# Patient Record
Sex: FEMALE | Race: WHITE | Employment: OTHER | URBAN - METROPOLITAN AREA
[De-identification: names, ages, dates, MRNs, and addresses within clinical notes are randomized per-mention and may not be internally consistent; named-entity substitution may affect disease eponyms.]

---

## 2018-09-19 PROCEDURE — 88175 CYTOPATH C/V AUTO FLUID REDO: CPT | Performed by: FAMILY MEDICINE

## 2018-09-19 PROCEDURE — 87624 HPV HI-RISK TYP POOLED RSLT: CPT | Performed by: FAMILY MEDICINE

## 2019-12-05 PROBLEM — G43.909 MIGRAINE WITHOUT STATUS MIGRAINOSUS, NOT INTRACTABLE, UNSPECIFIED MIGRAINE TYPE: Status: ACTIVE | Noted: 2019-12-05

## 2019-12-05 PROBLEM — E78.2 MIXED HYPERLIPIDEMIA: Status: ACTIVE | Noted: 2019-12-05

## 2019-12-05 PROBLEM — F51.01 PRIMARY INSOMNIA: Status: ACTIVE | Noted: 2019-12-05

## 2020-11-03 ENCOUNTER — APPOINTMENT (OUTPATIENT)
Dept: LAB | Facility: HOSPITAL | Age: 70
End: 2020-11-03
Attending: INTERNAL MEDICINE
Payer: MEDICARE

## 2020-11-03 DIAGNOSIS — Z01.818 PRE-OP TESTING: ICD-10-CM

## 2020-11-06 PROBLEM — Z86.010 HISTORY OF ADENOMATOUS POLYP OF COLON: Status: ACTIVE | Noted: 2020-11-06

## 2020-11-06 PROBLEM — K64.8 INTERNAL HEMORRHOIDS: Status: ACTIVE | Noted: 2020-11-06

## 2020-11-06 PROBLEM — K63.5 COLON POLYP: Status: ACTIVE | Noted: 2020-11-06

## 2020-11-06 PROBLEM — Z86.0101 HISTORY OF ADENOMATOUS POLYP OF COLON: Status: ACTIVE | Noted: 2020-11-06

## 2020-11-06 PROBLEM — K57.90 DIVERTICULOSIS: Status: ACTIVE | Noted: 2020-11-06

## 2021-02-09 ENCOUNTER — IMMUNIZATION (OUTPATIENT)
Dept: LAB | Age: 71
End: 2021-02-09
Attending: HOSPITALIST
Payer: MEDICARE

## 2021-02-09 DIAGNOSIS — Z23 NEED FOR VACCINATION: Primary | ICD-10-CM

## 2021-02-09 PROCEDURE — 0001A SARSCOV2 VAC 30MCG/0.3ML IM: CPT

## 2021-03-02 ENCOUNTER — IMMUNIZATION (OUTPATIENT)
Dept: LAB | Age: 71
End: 2021-03-02
Attending: HOSPITALIST
Payer: MEDICARE

## 2021-03-02 DIAGNOSIS — Z23 NEED FOR VACCINATION: Primary | ICD-10-CM

## 2021-03-02 PROCEDURE — 0002A SARSCOV2 VAC 30MCG/0.3ML IM: CPT

## 2021-03-10 ENCOUNTER — OFFICE VISIT (OUTPATIENT)
Dept: OBGYN CLINIC | Facility: CLINIC | Age: 71
End: 2021-03-10
Payer: MEDICARE

## 2021-03-10 VITALS
WEIGHT: 167 LBS | BODY MASS INDEX: 25.31 KG/M2 | TEMPERATURE: 97 F | DIASTOLIC BLOOD PRESSURE: 72 MMHG | HEIGHT: 68 IN | RESPIRATION RATE: 16 BRPM | SYSTOLIC BLOOD PRESSURE: 124 MMHG | HEART RATE: 95 BPM

## 2021-03-10 DIAGNOSIS — N81.4 UTERINE PROLAPSE: Primary | ICD-10-CM

## 2021-03-10 PROCEDURE — 99213 OFFICE O/P EST LOW 20 MIN: CPT | Performed by: OBSTETRICS & GYNECOLOGY

## 2021-03-10 NOTE — PROGRESS NOTES
Gorge Webber is a 80year old female  No LMP recorded. Patient is postmenopausal. Patient presents with: Other: discuss pessary. Sweetie Elder      He was here 2 years ago and was told if her uterine prolapse got uncomfortable to come back for possible pessar Narrative      Not on file    Social Determinants of Health  Financial Resource Strain:       Difficulty of Paying Living Expenses:   Food Insecurity:       Worried About Running Out of Food in the Last Year:       Ran Out of Food in the Last Year:   Trans (EUTHYROX) 50 MCG Oral Tab, Take 1 tablet (50 mcg total) by mouth once daily. , Disp: 90 tablet, Rfl: 3  •  PARoxetine HCl 20 MG Oral Tab, TAKE 1 TABLET BY MOUTH ONCE DAILY IN THE MORNING, Disp: 90 tablet, Rfl: 3  •  SUMAtriptan Succinate 25 MG Oral Tab, Ta no diagnoses linked to this encounter. Back into third-degree uterine prolapse. Fitted with a pessary #2 ring. Surgery was offered. She will consider and get back to us.

## 2021-07-20 ENCOUNTER — TELEPHONE (OUTPATIENT)
Dept: OBGYN CLINIC | Facility: CLINIC | Age: 71
End: 2021-07-20

## 2021-07-20 NOTE — TELEPHONE ENCOUNTER
Pt called to speak to a nurse about getting a Pessary. Pt states she had the fitting appointment and was told to call when she is ready to order. Please advise.

## 2021-08-16 ENCOUNTER — OFFICE VISIT (OUTPATIENT)
Dept: OBGYN CLINIC | Facility: CLINIC | Age: 71
End: 2021-08-16
Payer: MEDICARE

## 2021-08-16 ENCOUNTER — TELEPHONE (OUTPATIENT)
Dept: OBGYN CLINIC | Facility: CLINIC | Age: 71
End: 2021-08-16

## 2021-08-16 VITALS
DIASTOLIC BLOOD PRESSURE: 70 MMHG | SYSTOLIC BLOOD PRESSURE: 127 MMHG | HEART RATE: 78 BPM | BODY MASS INDEX: 25 KG/M2 | HEIGHT: 68 IN

## 2021-08-16 DIAGNOSIS — N81.4 UTERINE PROLAPSE: ICD-10-CM

## 2021-08-16 DIAGNOSIS — Z12.31 ENCOUNTER FOR MAMMOGRAM TO ESTABLISH BASELINE MAMMOGRAM: Primary | ICD-10-CM

## 2021-08-16 PROCEDURE — A4562 PESSARY, NON RUBBER,ANY TYPE: HCPCS | Performed by: OBSTETRICS & GYNECOLOGY

## 2021-08-16 PROCEDURE — 99212 OFFICE O/P EST SF 10 MIN: CPT | Performed by: OBSTETRICS & GYNECOLOGY

## 2021-08-16 NOTE — TELEPHONE ENCOUNTER
Patient here today for a pessary insertion. She called to make us aware it has fallen out. Instructed her I will route message to Dr Sandra Cockayne for further recommendation. Understanding verbalized.

## 2021-08-16 NOTE — PROGRESS NOTES
Cervix is at introitus when she is laying down. #2 ring pessary was placed. No complaints. 4 months.

## 2021-08-16 NOTE — TELEPHONE ENCOUNTER
Spoke with patient after clarifying with Dr Malvin Billingsley. Dr Malvin Billingsley would like a #2 1/2 sized pessary ordered. Will route to Sienna Locke to order. Patient aware will will place the order and once it comes in we will call her to schedule her appointment for placement.  Un

## 2021-09-15 ENCOUNTER — OFFICE VISIT (OUTPATIENT)
Dept: OBGYN CLINIC | Facility: CLINIC | Age: 71
End: 2021-09-15
Payer: MEDICARE

## 2021-09-15 VITALS — DIASTOLIC BLOOD PRESSURE: 68 MMHG | SYSTOLIC BLOOD PRESSURE: 118 MMHG | HEIGHT: 68 IN | BODY MASS INDEX: 25 KG/M2

## 2021-09-15 DIAGNOSIS — N81.4 UTERINE PROLAPSE: Primary | ICD-10-CM

## 2021-09-15 PROCEDURE — 99212 OFFICE O/P EST SF 10 MIN: CPT | Performed by: OBSTETRICS & GYNECOLOGY

## 2021-09-15 NOTE — PROGRESS NOTES
She has procidentia. Cervix is coming through the introitus when laying down. The other pessary came out after couple hours. She was placed with a 2.5 ring with support.   Cube was discussed if this has  problems

## 2021-09-16 ENCOUNTER — TELEPHONE (OUTPATIENT)
Dept: OBGYN CLINIC | Facility: CLINIC | Age: 71
End: 2021-09-16

## 2021-09-16 NOTE — TELEPHONE ENCOUNTER
Pt states the pessary came out overnight and she had urinated without knowing. Please call to advise the next steps.

## 2021-09-16 NOTE — TELEPHONE ENCOUNTER
Spoke with patient, she was having a bowel movement this morning and the pessary came out. She was leaking urine during the night as well and was not aware that this was happening. During the day yesterday the pessary was fine. Routed to provider for advisement. Patient verbalized understanding.

## 2021-09-17 ENCOUNTER — OFFICE VISIT (OUTPATIENT)
Dept: OBGYN CLINIC | Facility: CLINIC | Age: 71
End: 2021-09-17
Payer: MEDICARE

## 2021-09-17 VITALS
HEIGHT: 68 IN | HEART RATE: 86 BPM | SYSTOLIC BLOOD PRESSURE: 118 MMHG | DIASTOLIC BLOOD PRESSURE: 68 MMHG | BODY MASS INDEX: 25 KG/M2

## 2021-09-17 DIAGNOSIS — N81.4 UTERINE PROLAPSE: Primary | ICD-10-CM

## 2021-09-17 PROCEDURE — 99212 OFFICE O/P EST SF 10 MIN: CPT | Performed by: OBSTETRICS & GYNECOLOGY

## 2021-09-17 NOTE — PROGRESS NOTES
The last pessary stayed in almost 24 hours she did have increased loss of urine when it was in. Physiology of this was discussed. Was fitted for a #3 cube minimal bleeding. Will refer to uro-GYN if this does not work.

## 2021-10-06 ENCOUNTER — HOSPITAL ENCOUNTER (OUTPATIENT)
Dept: MAMMOGRAPHY | Age: 71
Discharge: HOME | End: 2021-10-06
Attending: OBSTETRICS & GYNECOLOGY
Payer: MEDICARE

## 2021-10-06 DIAGNOSIS — Z12.31 ENCOUNTER FOR MAMMOGRAM TO ESTABLISH BASELINE MAMMOGRAM: ICD-10-CM

## 2021-10-06 PROCEDURE — 77063 BREAST TOMOSYNTHESIS BI: CPT | Performed by: OBSTETRICS & GYNECOLOGY

## 2021-10-06 PROCEDURE — 77067 SCR MAMMO BI INCL CAD: CPT | Performed by: OBSTETRICS & GYNECOLOGY

## 2022-01-05 ENCOUNTER — OFFICE VISIT (OUTPATIENT)
Dept: OBGYN CLINIC | Facility: CLINIC | Age: 72
End: 2022-01-05
Payer: MEDICARE

## 2022-01-05 VITALS
BODY MASS INDEX: 25 KG/M2 | HEIGHT: 68 IN | SYSTOLIC BLOOD PRESSURE: 136 MMHG | DIASTOLIC BLOOD PRESSURE: 72 MMHG | HEART RATE: 94 BPM

## 2022-01-05 DIAGNOSIS — N95.0 POSTMENOPAUSAL BLEEDING: ICD-10-CM

## 2022-01-05 DIAGNOSIS — N81.4 UTERINE PROLAPSE: ICD-10-CM

## 2022-01-05 DIAGNOSIS — Z01.419 WELL WOMAN EXAM WITH ROUTINE GYNECOLOGICAL EXAM: ICD-10-CM

## 2022-01-05 DIAGNOSIS — Z12.31 ENCOUNTER FOR SCREENING MAMMOGRAM FOR BREAST CANCER: Primary | ICD-10-CM

## 2022-01-05 DIAGNOSIS — Z12.4 PAPANICOLAOU SMEAR FOR CERVICAL CANCER SCREENING: ICD-10-CM

## 2022-01-05 PROCEDURE — 88175 CYTOPATH C/V AUTO FLUID REDO: CPT | Performed by: OBSTETRICS & GYNECOLOGY

## 2022-01-05 PROCEDURE — G0101 CA SCREEN;PELVIC/BREAST EXAM: HCPCS | Performed by: OBSTETRICS & GYNECOLOGY

## 2022-01-05 NOTE — PROGRESS NOTES
Cuca Murphy is a 80year old female  No LMP recorded. Patient is postmenopausal. Patient presents with:  Wellness Visit: spotting off and on X 1 week pt unsure if it's due to pessary  .      This is the first time were changing the cube pessary she use: No      Sexual activity: Not on file    Other Topics      Concerns:        Not on file    Social History Narrative      Not on file    Social Determinants of Health  Financial Resource Strain: Not on file  Food Insecurity: Not on file  Transportation Vitamins-Minerals (MULTIVITAL) Oral Tab, Take 1 tablet by mouth daily. , Disp: , Rfl:   •  VITAMIN D OR, 1 TABLET DAILY, Disp: , Rfl:   •  FISH OIL OR, 1 TABLET TWICE DAILY, Disp: , Rfl:     ALLERGIES:    Ciprofloxacin           UNKNOWN  [facility] urology consult.

## 2022-01-11 LAB — LAST PAP RESULT: NORMAL

## 2022-02-23 ENCOUNTER — TELEPHONE (OUTPATIENT)
Dept: OBGYN CLINIC | Facility: CLINIC | Age: 72
End: 2022-02-23

## 2022-03-01 ENCOUNTER — HOSPITAL ENCOUNTER (OUTPATIENT)
Dept: BONE DENSITY | Age: 72
Discharge: HOME | End: 2022-03-01
Attending: OBSTETRICS & GYNECOLOGY
Payer: MEDICARE

## 2022-03-01 DIAGNOSIS — M85.80 OSTEOPENIA, UNSPECIFIED LOCATION: ICD-10-CM

## 2022-03-01 PROCEDURE — 77080 DXA BONE DENSITY AXIAL: CPT | Performed by: OBSTETRICS & GYNECOLOGY

## 2023-01-05 ENCOUNTER — HOSPITAL ENCOUNTER (OUTPATIENT)
Dept: MAMMOGRAPHY | Facility: HOSPITAL | Age: 73
Discharge: HOME | End: 2023-01-05
Attending: OBSTETRICS & GYNECOLOGY
Payer: MEDICARE

## 2023-01-05 DIAGNOSIS — Z12.31 ENCOUNTER FOR SCREENING MAMMOGRAM FOR BREAST CANCER: ICD-10-CM

## 2023-01-05 PROCEDURE — 77063 BREAST TOMOSYNTHESIS BI: CPT | Performed by: OBSTETRICS & GYNECOLOGY

## 2023-01-05 PROCEDURE — 77067 SCR MAMMO BI INCL CAD: CPT | Performed by: OBSTETRICS & GYNECOLOGY

## 2023-01-13 ENCOUNTER — HOSPITAL ENCOUNTER (OUTPATIENT)
Dept: MAMMOGRAPHY | Facility: HOSPITAL | Age: 73
Discharge: HOME | End: 2023-01-13
Attending: OBSTETRICS & GYNECOLOGY
Payer: MEDICARE

## 2023-01-13 DIAGNOSIS — R92.2 INCONCLUSIVE MAMMOGRAM: ICD-10-CM

## 2023-01-13 PROCEDURE — 77065 DX MAMMO INCL CAD UNI: CPT | Performed by: OBSTETRICS & GYNECOLOGY

## 2023-01-13 PROCEDURE — 76642 ULTRASOUND BREAST LIMITED: CPT | Performed by: OBSTETRICS & GYNECOLOGY

## 2023-01-13 PROCEDURE — 77061 BREAST TOMOSYNTHESIS UNI: CPT | Performed by: OBSTETRICS & GYNECOLOGY

## 2023-05-10 ENCOUNTER — TELEPHONE (OUTPATIENT)
Dept: UROLOGY | Facility: CLINIC | Age: 73
End: 2023-05-10

## 2023-05-17 ENCOUNTER — OFFICE VISIT (OUTPATIENT)
Dept: UROLOGY | Facility: CLINIC | Age: 73
End: 2023-05-17
Attending: OBSTETRICS & GYNECOLOGY
Payer: MEDICARE

## 2023-05-17 VITALS
HEIGHT: 68 IN | RESPIRATION RATE: 18 BRPM | HEART RATE: 76 BPM | DIASTOLIC BLOOD PRESSURE: 86 MMHG | WEIGHT: 167 LBS | SYSTOLIC BLOOD PRESSURE: 128 MMHG | BODY MASS INDEX: 25.31 KG/M2 | TEMPERATURE: 98 F

## 2023-05-17 DIAGNOSIS — N81.3 COMPLETE UTEROVAGINAL PROLAPSE: Primary | ICD-10-CM

## 2023-05-17 DIAGNOSIS — N81.11 CYSTOCELE, MIDLINE: ICD-10-CM

## 2023-05-17 DIAGNOSIS — N39.3 URINARY, INCONTINENCE, STRESS FEMALE: ICD-10-CM

## 2023-05-17 DIAGNOSIS — N81.6 RECTOCELE: ICD-10-CM

## 2023-05-17 PROCEDURE — 99212 OFFICE O/P EST SF 10 MIN: CPT

## 2023-05-17 RX ORDER — HYDROCHLOROTHIAZIDE 25 MG/1
25 TABLET ORAL DAILY
COMMUNITY
Start: 2023-04-22

## 2023-05-17 RX ORDER — LISINOPRIL 20 MG/1
20 TABLET ORAL DAILY
COMMUNITY
Start: 2023-04-22

## 2023-06-02 ENCOUNTER — TELEPHONE (OUTPATIENT)
Dept: UROLOGY | Facility: CLINIC | Age: 73
End: 2023-06-02

## 2023-06-06 ENCOUNTER — OFFICE VISIT (OUTPATIENT)
Dept: UROLOGY | Facility: CLINIC | Age: 73
End: 2023-06-06
Attending: OBSTETRICS & GYNECOLOGY
Payer: MEDICARE

## 2023-06-06 VITALS
DIASTOLIC BLOOD PRESSURE: 84 MMHG | BODY MASS INDEX: 25.31 KG/M2 | SYSTOLIC BLOOD PRESSURE: 138 MMHG | HEIGHT: 68 IN | WEIGHT: 167 LBS

## 2023-06-06 DIAGNOSIS — N81.6 RECTOCELE: ICD-10-CM

## 2023-06-06 DIAGNOSIS — N81.11 CYSTOCELE, MIDLINE: ICD-10-CM

## 2023-06-06 DIAGNOSIS — N81.3 COMPLETE UTEROVAGINAL PROLAPSE: Primary | ICD-10-CM

## 2023-06-06 DIAGNOSIS — Z01.812 PRE-PROCEDURE LAB EXAM: ICD-10-CM

## 2023-06-06 LAB
BLOOD URINE: NEGATIVE
CONTROL RUN WITHIN 24 HOURS?: YES
LEUKOCYTE ESTERASE URINE: NEGATIVE
NITRITE URINE: NEGATIVE

## 2023-06-06 PROCEDURE — 51797 INTRAABDOMINAL PRESSURE TEST: CPT

## 2023-06-06 PROCEDURE — 51784 ANAL/URINARY MUSCLE STUDY: CPT

## 2023-06-06 PROCEDURE — 51741 ELECTRO-UROFLOWMETRY FIRST: CPT

## 2023-06-06 PROCEDURE — 51729 CYSTOMETROGRAM W/VP&UP: CPT

## 2023-06-06 PROCEDURE — 81002 URINALYSIS NONAUTO W/O SCOPE: CPT

## 2023-06-06 NOTE — PATIENT INSTRUCTIONS
[facility] UROGYNECOLOGY  [address]  [address]: [phone]  FAX: [phone]       Urodynamic Testing Discharge Instructions: There are NO dietary or activity restrictions. You may resume your normal schedule. You may have mild discomfort for a few hours after your testing today. There may be some mild burning when you urinate or you may see some blood in your urine. These problems should not last more than 24 hours. The following suggestions may minimize any symptoms you experience. Drink 6-8 large glasses of water over the next 8 hours  A compress or sitz bath may be soothing  Tylenol or Ibuprofen may be taken as needed    If you experience any of the following, please call the office or, if after hours, the on-call physician at [phone]. Excessive pain  Bright red bloody discharge  Fever or chills  Continued urgency, frequency or burning with urination    Obtaining Test Results    Your urodynamic test will be interpreted by a specialist and available to the referring physician within 7-14 days. Patients in our clinic are given an appointment to come back to discuss the results and any appropriate treatment recommendations. Please do not hesitate to contact our office with any questions or concerns at [phone]. I acknowledge that I have received verbal and written discharge  instructions and that I understand these instructions clearly.     Patient Signature:    Date:

## 2023-06-09 ENCOUNTER — OFFICE VISIT (OUTPATIENT)
Dept: UROLOGY | Facility: CLINIC | Age: 73
End: 2023-06-09
Attending: OBSTETRICS & GYNECOLOGY
Payer: MEDICARE

## 2023-06-09 DIAGNOSIS — N81.11 CYSTOCELE, MIDLINE: ICD-10-CM

## 2023-06-09 DIAGNOSIS — N81.6 RECTOCELE: ICD-10-CM

## 2023-06-09 DIAGNOSIS — N81.3 COMPLETE UTEROVAGINAL PROLAPSE: Primary | ICD-10-CM

## 2023-06-09 DIAGNOSIS — N39.3 URINARY, INCONTINENCE, STRESS FEMALE: ICD-10-CM

## 2023-06-09 PROCEDURE — 99212 OFFICE O/P EST SF 10 MIN: CPT

## 2023-06-09 NOTE — PROGRESS NOTES
ID: Luigi Coley  : 1950  Date: 23    Patient presents with:  UDS Follow-up  Other      HPI:  The patient is a 68year-old female,  (vaginal delivery), who was last originally seen in the office on 23. Please refer to previous office note for full details. To summarize, she presented for evaluation of vaginal prolapse for the past 3 years. She was fitted for a pessary which she tried for about a year. This caused spotting and pessary was removed on 2022 with no further bleeding. She was referred for consultation but did not come until now. She feels prolapse getting worse. She has to reduce for comfort. Voids every 2-3 hours during the day and once at night. Denies JOLYNN or UUI. She did have incontinence with pessary in place. Denies history of UTIs. Bowels are regular. She is not sexually active due to her prolapse. PMHx: HTN, diverticulosis, hypothyroid, GERD, hyperlipidemia, anxiety,     Initial exam demonstrated + UGA, stage 4 uterovaginal prolapse with the cervix being the leading edge of the prolapse at +8 cm. The patient wishes to proceed with surgery. She returns for follow up after having urodynamic testing. Interval history:  Urodynamic testing undergone without complication on . Results reviewed with patient. 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 VLLP 42 cm of water in the absence of DO. Pressure/flow study:  Voided 450 mL with PVR of 25 mL.        Urogynecology Summary:  Urogynecology Summary  Prolapse: Yes  JOLYNN: No  Urge Incontinence: No  Nocturia Frequency: 1  Frequency: 2 - 3 hours  Incomplete emptying: No  Constipation: No  Wears pad day?: 0  Wears Pad Night?: 1  Activities are limited by UI/POP?: No  Currently Sexually Active: No (avoids due to bulge)      Review of Systems:    A comprehensive 12 point review of systems was completed. Pertinent positives noted in the the HPI. Denies CP  Denies SOB    Vitals: There were no vitals taken for this visit. General: Alert and oriented x 3, no acute distress. Pelvic: deferred. Impression:  (N81.3) Complete uterovaginal prolapse  (primary encounter diagnosis)    (N81.11) Cystocele, midline    (N81.6) Rectocele    (N39.3) Urinary, incontinence, stress female      Plan:  Discussed with patient and her , management options for her symptoms. She wishes to proceed with definitive surgical repair. She can be approached vaginally with TVH, possible BS, USLS, APE repairs, sling, cysto. Thorough discussion of surgical risks, benefits, and alternatives including, but not limited to bleeding/clots, infection, injury to nearby organs (urethra, bladder, ureters, bowel, blood vessels), mesh erosion/exposure, dyspareunia, worsening UUI, persistent JOLYNN, prolapse recurrence, voiding dysfunction, and pain. Possible need for additional surgeries to address any complications reviewed. Discussed pain mgmt and potential need for narcotics. Discussed addiction potential with narcotics. IL  reviewed. We reviewed hospital stay and postoperative convalescence. She understands she will need to go home with a catheter. All questions answered. Pre-operative instructions reviewed. IULTD consent signed. She would like to schedule surgery ASAP.         Dr. Perlita Hardin MD, [facility]  Female Pelvic Medicine and  Reconstructive Surgery (Urogynecology)  [phone] (Pager)

## 2023-06-12 DIAGNOSIS — N39.3 URINARY, INCONTINENCE, STRESS FEMALE: ICD-10-CM

## 2023-06-12 DIAGNOSIS — N81.3 UTEROVAGINAL PROLAPSE, COMPLETE: Primary | ICD-10-CM

## 2023-06-12 DIAGNOSIS — N81.11 CYSTOCELE, MIDLINE: ICD-10-CM

## 2023-07-17 ENCOUNTER — LABORATORY ENCOUNTER (OUTPATIENT)
Dept: LAB | Facility: HOSPITAL | Age: 73
End: 2023-07-17
Attending: OBSTETRICS & GYNECOLOGY
Payer: MEDICARE

## 2023-07-17 DIAGNOSIS — Z01.818 PRE-OP TESTING: ICD-10-CM

## 2023-07-17 LAB
ANION GAP SERPL CALC-SCNC: 4 MMOL/L (ref 0–18)
ANTIBODY SCREEN: NEGATIVE
ATRIAL RATE: 70 BPM
BUN BLD-MCNC: 27 MG/DL (ref 7–18)
CALCIUM BLD-MCNC: 9.4 MG/DL (ref 8.5–10.1)
CHLORIDE SERPL-SCNC: 105 MMOL/L (ref 98–112)
CO2 SERPL-SCNC: 28 MMOL/L (ref 21–32)
CREAT BLD-MCNC: 1.14 MG/DL
ERYTHROCYTE [DISTWIDTH] IN BLOOD BY AUTOMATED COUNT: 12.9 %
FASTING STATUS PATIENT QL REPORTED: NO
GFR SERPLBLD BASED ON 1.73 SQ M-ARVRAT: 51 ML/MIN/1.73M2 (ref 60–?)
GLUCOSE BLD-MCNC: 95 MG/DL (ref 70–99)
HCT VFR BLD AUTO: 41.4 %
HGB BLD-MCNC: 13.6 G/DL
MCH RBC QN AUTO: 31.3 PG (ref 26–34)
MCHC RBC AUTO-ENTMCNC: 32.9 G/DL (ref 31–37)
MCV RBC AUTO: 95.4 FL
OSMOLALITY SERPL CALC.SUM OF ELEC: 289 MOSM/KG (ref 275–295)
P AXIS: 48 DEGREES
P-R INTERVAL: 146 MS
PLATELET # BLD AUTO: 276 10(3)UL (ref 150–450)
POTASSIUM SERPL-SCNC: 4.2 MMOL/L (ref 3.5–5.1)
Q-T INTERVAL: 400 MS
QRS DURATION: 76 MS
QTC CALCULATION (BEZET): 432 MS
R AXIS: 1 DEGREES
RBC # BLD AUTO: 4.34 X10(6)UL
RH BLOOD TYPE: POSITIVE
SODIUM SERPL-SCNC: 137 MMOL/L (ref 136–145)
T AXIS: 12 DEGREES
VENTRICULAR RATE: 70 BPM
WBC # BLD AUTO: 5.6 X10(3) UL (ref 4–11)

## 2023-07-17 PROCEDURE — 80048 BASIC METABOLIC PNL TOTAL CA: CPT

## 2023-07-17 PROCEDURE — 86901 BLOOD TYPING SEROLOGIC RH(D): CPT

## 2023-07-17 PROCEDURE — 85027 COMPLETE CBC AUTOMATED: CPT

## 2023-07-17 PROCEDURE — 93005 ELECTROCARDIOGRAM TRACING: CPT

## 2023-07-17 PROCEDURE — 86900 BLOOD TYPING SEROLOGIC ABO: CPT

## 2023-07-17 PROCEDURE — 93010 ELECTROCARDIOGRAM REPORT: CPT | Performed by: INTERNAL MEDICINE

## 2023-07-17 PROCEDURE — 86850 RBC ANTIBODY SCREEN: CPT

## 2023-07-17 PROCEDURE — 36415 COLL VENOUS BLD VENIPUNCTURE: CPT

## 2023-07-17 NOTE — PROGRESS NOTES
Discussed providers message: lab results and recommendation to avoid NSAIDS, patient verbalized understanding.

## 2023-07-27 ENCOUNTER — ANESTHESIA EVENT (OUTPATIENT)
Dept: SURGERY | Facility: HOSPITAL | Age: 73
End: 2023-07-27
Payer: MEDICARE

## 2023-07-27 ENCOUNTER — HOSPITAL ENCOUNTER (OUTPATIENT)
Facility: HOSPITAL | Age: 73
Discharge: HOME | End: 2023-07-28
Attending: OBSTETRICS & GYNECOLOGY | Admitting: OBSTETRICS & GYNECOLOGY
Payer: MEDICARE

## 2023-07-27 ENCOUNTER — ANESTHESIA (OUTPATIENT)
Dept: SURGERY | Facility: HOSPITAL | Age: 73
End: 2023-07-27
Payer: MEDICARE

## 2023-07-27 DIAGNOSIS — Z01.818 PRE-OP TESTING: Primary | ICD-10-CM

## 2023-07-27 DIAGNOSIS — N81.3 UTEROVAGINAL PROLAPSE, COMPLETE: ICD-10-CM

## 2023-07-27 DIAGNOSIS — N81.11 CYSTOCELE, MIDLINE: ICD-10-CM

## 2023-07-27 DIAGNOSIS — N39.3 URINARY, INCONTINENCE, STRESS FEMALE: ICD-10-CM

## 2023-07-27 PROBLEM — Z90.710 STATUS POST VAGINAL HYSTERECTOMY: Status: ACTIVE | Noted: 2023-07-27

## 2023-07-27 PROBLEM — N94.89 ADNEXAL MASS: Status: ACTIVE | Noted: 2023-07-27

## 2023-07-27 LAB — CANCER AG125 SERPL-ACNC: 19.1 U/ML (ref ?–35)

## 2023-07-27 PROCEDURE — 0USG0ZZ REPOSITION VAGINA, OPEN APPROACH: ICD-10-PCS | Performed by: OBSTETRICS & GYNECOLOGY

## 2023-07-27 PROCEDURE — 0UT97ZZ RESECTION OF UTERUS, VIA NATURAL OR ARTIFICIAL OPENING: ICD-10-PCS | Performed by: OBSTETRICS & GYNECOLOGY

## 2023-07-27 PROCEDURE — 58262 VAG HYST INCLUDING T/O: CPT | Performed by: OBSTETRICS & GYNECOLOGY

## 2023-07-27 PROCEDURE — 0JQC0ZZ REPAIR PELVIC REGION SUBCUTANEOUS TISSUE AND FASCIA, OPEN APPROACH: ICD-10-PCS | Performed by: OBSTETRICS & GYNECOLOGY

## 2023-07-27 PROCEDURE — 0TSD0ZZ REPOSITION URETHRA, OPEN APPROACH: ICD-10-PCS | Performed by: OBSTETRICS & GYNECOLOGY

## 2023-07-27 DEVICE — TRANSVAGINAL MID-URETHRAL SLING
Type: IMPLANTABLE DEVICE | Site: VAGINA | Status: FUNCTIONAL
Brand: ADVANTAGE FIT™  SYSTEM

## 2023-07-27 RX ORDER — CEFAZOLIN SODIUM 1 G/3ML
INJECTION, POWDER, FOR SOLUTION INTRAMUSCULAR; INTRAVENOUS AS NEEDED
Status: DISCONTINUED | OUTPATIENT
Start: 2023-07-27 | End: 2023-07-27 | Stop reason: SURG

## 2023-07-27 RX ORDER — LIDOCAINE HYDROCHLORIDE ANHYDROUS AND DEXTROSE MONOHYDRATE .8; 5 G/100ML; G/100ML
INJECTION, SOLUTION INTRAVENOUS CONTINUOUS PRN
Status: DISCONTINUED | OUTPATIENT
Start: 2023-07-27 | End: 2023-07-27 | Stop reason: SURG

## 2023-07-27 RX ORDER — ONDANSETRON 2 MG/ML
INJECTION INTRAMUSCULAR; INTRAVENOUS AS NEEDED
Status: DISCONTINUED | OUTPATIENT
Start: 2023-07-27 | End: 2023-07-27 | Stop reason: SURG

## 2023-07-27 RX ORDER — HYDROMORPHONE HYDROCHLORIDE 1 MG/ML
0.4 INJECTION, SOLUTION INTRAMUSCULAR; INTRAVENOUS; SUBCUTANEOUS EVERY 2 HOUR PRN
Status: DISCONTINUED | OUTPATIENT
Start: 2023-07-27 | End: 2023-07-28

## 2023-07-27 RX ORDER — ONDANSETRON 2 MG/ML
4 INJECTION INTRAMUSCULAR; INTRAVENOUS EVERY 6 HOURS PRN
Status: DISCONTINUED | OUTPATIENT
Start: 2023-07-27 | End: 2023-07-27 | Stop reason: HOSPADM

## 2023-07-27 RX ORDER — SODIUM CHLORIDE, SODIUM LACTATE, POTASSIUM CHLORIDE, CALCIUM CHLORIDE 600; 310; 30; 20 MG/100ML; MG/100ML; MG/100ML; MG/100ML
INJECTION, SOLUTION INTRAVENOUS CONTINUOUS
Status: DISCONTINUED | OUTPATIENT
Start: 2023-07-27 | End: 2023-07-27 | Stop reason: HOSPADM

## 2023-07-27 RX ORDER — LIDOCAINE HYDROCHLORIDE 10 MG/ML
INJECTION, SOLUTION EPIDURAL; INFILTRATION; INTRACAUDAL; PERINEURAL AS NEEDED
Status: DISCONTINUED | OUTPATIENT
Start: 2023-07-27 | End: 2023-07-27 | Stop reason: SURG

## 2023-07-27 RX ORDER — NEOSTIGMINE METHYLSULFATE 1 MG/ML
INJECTION, SOLUTION INTRAVENOUS AS NEEDED
Status: DISCONTINUED | OUTPATIENT
Start: 2023-07-27 | End: 2023-07-27 | Stop reason: SURG

## 2023-07-27 RX ORDER — SUMATRIPTAN 25 MG/1
25 TABLET, FILM COATED ORAL EVERY 2 HOUR PRN
Status: DISCONTINUED | OUTPATIENT
Start: 2023-07-27 | End: 2023-07-28

## 2023-07-27 RX ORDER — DEXAMETHASONE SODIUM PHOSPHATE 4 MG/ML
VIAL (ML) INJECTION AS NEEDED
Status: DISCONTINUED | OUTPATIENT
Start: 2023-07-27 | End: 2023-07-27 | Stop reason: SURG

## 2023-07-27 RX ORDER — HYDROCODONE BITARTRATE AND ACETAMINOPHEN 5; 325 MG/1; MG/1
2 TABLET ORAL ONCE AS NEEDED
Status: DISCONTINUED | OUTPATIENT
Start: 2023-07-27 | End: 2023-07-27 | Stop reason: HOSPADM

## 2023-07-27 RX ORDER — SODIUM CHLORIDE, SODIUM LACTATE, POTASSIUM CHLORIDE, CALCIUM CHLORIDE 600; 310; 30; 20 MG/100ML; MG/100ML; MG/100ML; MG/100ML
INJECTION, SOLUTION INTRAVENOUS CONTINUOUS
Status: DISCONTINUED | OUTPATIENT
Start: 2023-07-27 | End: 2023-07-28

## 2023-07-27 RX ORDER — ROCURONIUM BROMIDE 10 MG/ML
INJECTION, SOLUTION INTRAVENOUS AS NEEDED
Status: DISCONTINUED | OUTPATIENT
Start: 2023-07-27 | End: 2023-07-27 | Stop reason: SURG

## 2023-07-27 RX ORDER — NALOXONE HYDROCHLORIDE 0.4 MG/ML
80 INJECTION, SOLUTION INTRAMUSCULAR; INTRAVENOUS; SUBCUTANEOUS AS NEEDED
Status: DISCONTINUED | OUTPATIENT
Start: 2023-07-27 | End: 2023-07-27 | Stop reason: HOSPADM

## 2023-07-27 RX ORDER — DEXTROSE MONOHYDRATE, SODIUM CHLORIDE, AND POTASSIUM CHLORIDE 50; 1.49; 4.5 G/1000ML; G/1000ML; G/1000ML
INJECTION, SOLUTION INTRAVENOUS CONTINUOUS
Status: DISCONTINUED | OUTPATIENT
Start: 2023-07-27 | End: 2023-07-28

## 2023-07-27 RX ORDER — BUPIVACAINE HYDROCHLORIDE AND EPINEPHRINE 2.5; 5 MG/ML; UG/ML
INJECTION, SOLUTION EPIDURAL; INFILTRATION; INTRACAUDAL; PERINEURAL AS NEEDED
Status: DISCONTINUED | OUTPATIENT
Start: 2023-07-27 | End: 2023-07-27 | Stop reason: HOSPADM

## 2023-07-27 RX ORDER — METOCLOPRAMIDE HYDROCHLORIDE 5 MG/ML
10 INJECTION INTRAMUSCULAR; INTRAVENOUS EVERY 8 HOURS PRN
Status: DISCONTINUED | OUTPATIENT
Start: 2023-07-27 | End: 2023-07-27 | Stop reason: HOSPADM

## 2023-07-27 RX ORDER — HYDROMORPHONE HYDROCHLORIDE 1 MG/ML
0.6 INJECTION, SOLUTION INTRAMUSCULAR; INTRAVENOUS; SUBCUTANEOUS EVERY 5 MIN PRN
Status: DISCONTINUED | OUTPATIENT
Start: 2023-07-27 | End: 2023-07-27 | Stop reason: HOSPADM

## 2023-07-27 RX ORDER — HYDROMORPHONE HYDROCHLORIDE 1 MG/ML
0.2 INJECTION, SOLUTION INTRAMUSCULAR; INTRAVENOUS; SUBCUTANEOUS EVERY 5 MIN PRN
Status: DISCONTINUED | OUTPATIENT
Start: 2023-07-27 | End: 2023-07-27 | Stop reason: HOSPADM

## 2023-07-27 RX ORDER — ENOXAPARIN SODIUM 100 MG/ML
40 INJECTION SUBCUTANEOUS DAILY
Status: DISCONTINUED | OUTPATIENT
Start: 2023-07-28 | End: 2023-07-28

## 2023-07-27 RX ORDER — HYDROCODONE BITARTRATE AND ACETAMINOPHEN 5; 325 MG/1; MG/1
1 TABLET ORAL ONCE AS NEEDED
Status: DISCONTINUED | OUTPATIENT
Start: 2023-07-27 | End: 2023-07-27 | Stop reason: HOSPADM

## 2023-07-27 RX ORDER — PHENYLEPHRINE HCL 10 MG/ML
VIAL (ML) INJECTION AS NEEDED
Status: DISCONTINUED | OUTPATIENT
Start: 2023-07-27 | End: 2023-07-27 | Stop reason: SURG

## 2023-07-27 RX ORDER — LEVOTHYROXINE SODIUM 0.05 MG/1
50 TABLET ORAL DAILY
Status: DISCONTINUED | OUTPATIENT
Start: 2023-07-27 | End: 2023-07-28

## 2023-07-27 RX ORDER — HYDROMORPHONE HYDROCHLORIDE 1 MG/ML
0.2 INJECTION, SOLUTION INTRAMUSCULAR; INTRAVENOUS; SUBCUTANEOUS EVERY 2 HOUR PRN
Status: DISCONTINUED | OUTPATIENT
Start: 2023-07-27 | End: 2023-07-28

## 2023-07-27 RX ORDER — ACETAMINOPHEN 500 MG
1000 TABLET ORAL ONCE
Status: DISCONTINUED | OUTPATIENT
Start: 2023-07-27 | End: 2023-07-27 | Stop reason: HOSPADM

## 2023-07-27 RX ORDER — ACETAMINOPHEN 500 MG
1000 TABLET ORAL EVERY 8 HOURS SCHEDULED
Status: DISCONTINUED | OUTPATIENT
Start: 2023-07-27 | End: 2023-07-28

## 2023-07-27 RX ORDER — CLINDAMYCIN PHOSPHATE 900 MG/50ML
900 INJECTION INTRAVENOUS ONCE
Status: DISCONTINUED | OUTPATIENT
Start: 2023-07-27 | End: 2023-07-27 | Stop reason: HOSPADM

## 2023-07-27 RX ORDER — VASOPRESSIN 20 U/ML
INJECTION PARENTERAL AS NEEDED
Status: DISCONTINUED | OUTPATIENT
Start: 2023-07-27 | End: 2023-07-27 | Stop reason: HOSPADM

## 2023-07-27 RX ORDER — HYDROMORPHONE HYDROCHLORIDE 1 MG/ML
0.4 INJECTION, SOLUTION INTRAMUSCULAR; INTRAVENOUS; SUBCUTANEOUS EVERY 5 MIN PRN
Status: DISCONTINUED | OUTPATIENT
Start: 2023-07-27 | End: 2023-07-27 | Stop reason: HOSPADM

## 2023-07-27 RX ORDER — ONDANSETRON 4 MG/1
4 TABLET, FILM COATED ORAL EVERY 8 HOURS PRN
Status: DISCONTINUED | OUTPATIENT
Start: 2023-07-27 | End: 2023-07-28

## 2023-07-27 RX ORDER — ONDANSETRON 2 MG/ML
4 INJECTION INTRAMUSCULAR; INTRAVENOUS EVERY 8 HOURS PRN
Status: DISCONTINUED | OUTPATIENT
Start: 2023-07-27 | End: 2023-07-28

## 2023-07-27 RX ORDER — HYDROCHLOROTHIAZIDE 25 MG/1
25 TABLET ORAL DAILY
Status: DISCONTINUED | OUTPATIENT
Start: 2023-07-27 | End: 2023-07-28

## 2023-07-27 RX ORDER — LISINOPRIL 20 MG/1
20 TABLET ORAL DAILY
Status: DISCONTINUED | OUTPATIENT
Start: 2023-07-27 | End: 2023-07-28

## 2023-07-27 RX ORDER — LABETALOL HYDROCHLORIDE 5 MG/ML
5 INJECTION, SOLUTION INTRAVENOUS EVERY 5 MIN PRN
Status: DISCONTINUED | OUTPATIENT
Start: 2023-07-27 | End: 2023-07-27 | Stop reason: HOSPADM

## 2023-07-27 RX ORDER — PAROXETINE HYDROCHLORIDE 20 MG/1
20 TABLET, FILM COATED ORAL DAILY
Status: DISCONTINUED | OUTPATIENT
Start: 2023-07-27 | End: 2023-07-28

## 2023-07-27 RX ORDER — GLYCOPYRROLATE 0.2 MG/ML
INJECTION, SOLUTION INTRAMUSCULAR; INTRAVENOUS AS NEEDED
Status: DISCONTINUED | OUTPATIENT
Start: 2023-07-27 | End: 2023-07-27 | Stop reason: SURG

## 2023-07-27 RX ORDER — QUINAPRIL HCL AND HYDROCHLOROTHIAZIDE 20; 25 MG/1; MG/1
1 TABLET ORAL
Status: DISCONTINUED | OUTPATIENT
Start: 2023-07-27 | End: 2023-07-27

## 2023-07-27 RX ORDER — ACETAMINOPHEN 500 MG
1000 TABLET ORAL ONCE AS NEEDED
Status: DISCONTINUED | OUTPATIENT
Start: 2023-07-27 | End: 2023-07-27 | Stop reason: HOSPADM

## 2023-07-27 RX ADMIN — CEFAZOLIN SODIUM 2 G: 1 INJECTION, POWDER, FOR SOLUTION INTRAMUSCULAR; INTRAVENOUS at 14:05:00

## 2023-07-27 RX ADMIN — SODIUM CHLORIDE, SODIUM LACTATE, POTASSIUM CHLORIDE, CALCIUM CHLORIDE: 600; 310; 30; 20 INJECTION, SOLUTION INTRAVENOUS at 13:55:00

## 2023-07-27 RX ADMIN — NEOSTIGMINE METHYLSULFATE 3 MG: 1 INJECTION, SOLUTION INTRAVENOUS at 16:40:00

## 2023-07-27 RX ADMIN — ONDANSETRON 4 MG: 2 INJECTION INTRAMUSCULAR; INTRAVENOUS at 14:45:00

## 2023-07-27 RX ADMIN — PHENYLEPHRINE HCL 100 MCG: 10 MG/ML VIAL (ML) INJECTION at 14:04:00

## 2023-07-27 RX ADMIN — DEXAMETHASONE SODIUM PHOSPHATE 4 MG: 4 MG/ML VIAL (ML) INJECTION at 14:02:00

## 2023-07-27 RX ADMIN — LIDOCAINE HYDROCHLORIDE ANHYDROUS AND DEXTROSE MONOHYDRATE 2 MG/KG/HR: .8; 5 INJECTION, SOLUTION INTRAVENOUS at 14:10:00

## 2023-07-27 RX ADMIN — SODIUM CHLORIDE, SODIUM LACTATE, POTASSIUM CHLORIDE, CALCIUM CHLORIDE: 600; 310; 30; 20 INJECTION, SOLUTION INTRAVENOUS at 16:37:00

## 2023-07-27 RX ADMIN — ROCURONIUM BROMIDE 50 MG: 10 INJECTION, SOLUTION INTRAVENOUS at 13:59:00

## 2023-07-27 RX ADMIN — GLYCOPYRROLATE 0.4 MG: 0.2 INJECTION, SOLUTION INTRAMUSCULAR; INTRAVENOUS at 16:40:00

## 2023-07-27 RX ADMIN — LIDOCAINE HYDROCHLORIDE 5 ML: 10 INJECTION, SOLUTION EPIDURAL; INFILTRATION; INTRACAUDAL; PERINEURAL at 13:59:00

## 2023-07-27 NOTE — INTERVAL H&P NOTE
Pre-op Diagnosis: Uterovaginal prolapse, complete [N81.3]  Cystocele, midline [N81.11]  Urinary, incontinence, stress female [N39.3]    The above referenced H&P was reviewed by Pieter Louis MD on 7/27/2023, the patient was examined and no significant changes have occurred in the patient's condition since the H&P was performed. I discussed with the patient and/or legal representative the potential benefits, risks and side effects of this procedure; the likelihood of the patient achieving goals; and potential problems that might occur during recuperation. I discussed reasonable alternatives to the procedure, including risks, benefits and side effects related to the alternatives and risks related to not receiving this procedure. We will proceed with procedure as planned.

## 2023-07-27 NOTE — PROGRESS NOTES
[facility]  Brief Op Note    Gita Andrews Location: OR   [facility] [id number] MRN [id number]   Admission Date 7/27/2023 Operation Date 7/27/2023   Attending Physician Vickie Lin MD Operating Physician Renae Rolon MD     Pre-Operative Diagnosis: Uterovaginal prolapse, complete [N81.3]  Cystocele, midline [N81.11]  Urinary, incontinence, stress female [N39.3]    Post-Operative Diagnosis: Same as above    Procedure Performed: Procedure(s):  TOTAL VAGINAL HYSTERECTOMY, LEFT SALPINGECTOMY AND LEFT OOPHORECTOMY ([facility])  [facility] ENTEROCELE REPAIR, PLACEMENT OF MID-URETHRAL SLING CYSTOSCOPY ([address])    Anesthesia: General    EBL: 50 cc    Summary of Case: prolapse uterus  right pelvic mass  Complications:none  Renae Rolon MD  7/27/2023  3:09 PM

## 2023-07-27 NOTE — H&P
[facility]    PATIENT'S NAME: Patricia Martinez   ATTENDING PHYSICIAN: Martín Hernandez M.D. PATIENT ACCOUNT#:   [de-identified]    LOCATION:    MEDICAL RECORD #:   [id number]       YOB: 1950  ADMISSION DATE:       07/27/2023    HISTORY AND PHYSICAL EXAMINATION    HISTORY OF PRESENT ILLNESS:  She is a 78-year-old G1, P1, who was having some prolapse. She had 1 vaginal delivery. She was using a pessary. Was referred to Urogynecology for possible surgical repair. PAST MEDICAL HISTORY:  She had 1 vaginal delivery. MEDICATIONS:  Levothyroxine, omeprazole, high blood pressure medicine, atorvastatin, sumatriptan, Xanax. PHYSICAL EXAMINATION:    GENERAL:  Well-developed, well-nourished female. LUNGS:  Clear. HEART:  Regular rate and rhythm. ABDOMEN:  Soft. PELVIC:  Refer to Urogynecology. ASSESSMENT:  Prolapse. PLAN:  Surgical repair with Urogynecology after vaginal hysterectomy.       Dictated By Martín Hernandez M.D.  d: 07/27/2023 08:57:21  t: 07/27/2023 09:11:49  Job [id number]  Z/

## 2023-07-27 NOTE — BRIEF OP NOTE
Pre-Operative Diagnosis: Uterovaginal prolapse, complete [N81.3]  Cystocele, midline [N81.11]  Urinary, incontinence, stress female [N39.3]     Post-Operative Diagnosis: Uterovaginal prolapse, complete [T73. 3]Cystocele, midline [N81.11]Urinary, incontinence, stress female [N39.3] right adnexal mass      Procedure Performed:   TOTAL VAGINAL HYSTERECTOMY, LEFT SALPINGECTOMY AND LEFT OOPHORECTOMY ([facility])  Uterosacral ligament suspension, anterior, posterior, enterocele repair, sling, cystoscopy ([facility]). Surgeon(s) and Role:  Panel 1:     * Yogi Dhaliwal MD - Primary     * Nathaly Mendez MD - Assisting Surgeon  Panel 2:     Burt Feng MD - Primary    Assistant(s):  Surgical Assistant.: Katrin Delgado     Surgical Findings: Stage 4 uterovaginal prolapse, cystocele, rectocele, enterocele. Large, 15 cm right adnexal mass. Specimen: Not from my portion of the procedure.       Estimated Blood Loss: Blood Output: 150 mL (7/27/2023  4:38 PM)      Shauna Hernandez MD  7/27/2023  6:43 PM

## 2023-07-27 NOTE — ANESTHESIA PROCEDURE NOTES
Airway  Date/Time: 7/27/2023 2:01 PM  Urgency: elective    Airway not difficult    General Information and Staff    Patient location during procedure: OR  Anesthesiologist: Victorina Adam MD  Performed: anesthesiologist   Performed by: Victorina Adam MD  Authorized by: Victorina Adam MD      Indications and Patient Condition  Indications for airway management: anesthesia  Sedation level: deep  Preoxygenated: yes  Patient position: sniffing  Mask difficulty assessment: 1 - vent by mask    Final Airway Details  Final airway type: endotracheal airway      Successful airway: ETT  Cuffed: yes   Successful intubation technique: direct laryngoscopy  Facilitating devices/methods: intubating stylet  Endotracheal tube insertion site: oral  Blade: Kamala  Blade size: #3  ETT size (mm): 7.0    Cormack-Lehane Classification: grade I - full view of glottis  Placement verified by: capnometry   Measured from: lips  ETT to lips (cm): 22  Number of attempts at approach: 1

## 2023-07-27 NOTE — ANESTHESIA POSTPROCEDURE EVALUATION
[facility] Patient Status:  Hospital Outpatient Surgery   Age/Gender 68year old female MRN [id number]   [facility] SURGERY Attending Ameena Collins MD   [address] Day # 0 PCP Tenisha Varner DO       Anesthesia Post-op Note    TOTAL VAGINAL HYSTERECTOMY, LEFT SALPINGECTOMY AND LEFT OOPHORECTOMY [place])    Procedure Summary       Date: 07/27/23 Room / Location: [facility] MAIN OR 09 / 1404 [facility] OR    Anesthesia Start: 1175 Anesthesia Stop: 6188    Procedures:       TOTAL VAGINAL HYSTERECTOMY, LEFT SALPINGECTOMY AND LEFT OOPHORECTOMY ([facility]) (Bilateral: Vagina )      URETEROSACRAL LIGAMENT SUSPENSION ANTERIOR POSTERIOR ENTEROCELE REPAIR, PLACEMENT OF MID-URETHRAL SLING CYSTOSCOPY ([facility]) (Vagina ) Diagnosis:       Uterovaginal prolapse, complete      Cystocele, midline      Urinary, incontinence, stress female      Rectocele      Omental mass      (Uterovaginal prolapse, complete [H12. 3]Cystocele, midline [N81.11]Urinary, incontinence, stress female [N39.3] right omental mass)    Surgeons: Ameena Collins MD; Quin Barger MD Anesthesiologist: Richmond Reed MD    Anesthesia Type: general ASA Status: 2            Anesthesia Type: general    Vitals Value Taken Time   /62 07/27/23 1651   Temp 99.1 07/27/23 1651   Pulse 78 07/27/23 1651   Resp 12 07/27/23 1651   SpO2 92 % 07/27/23 1651   Vitals shown include unvalidated device data. Patient Location: PACU    Anesthesia Type: general    Airway Patency: patent and extubated    Postop Pain Control: adequate    Mental Status: preanesthetic baseline    Nausea/Vomiting: none    Cardiopulmonary/Hydration status: stable euvolemic    Complications: no apparent anesthesia related complications    Postop vital signs: stable    Dental Exam: Unchanged from Preop    Patient to be discharged from PACU when criteria met.

## 2023-07-28 ENCOUNTER — APPOINTMENT (OUTPATIENT)
Dept: CT IMAGING | Facility: HOSPITAL | Age: 73
End: 2023-07-28
Attending: OBSTETRICS & GYNECOLOGY
Payer: MEDICARE

## 2023-07-28 VITALS
TEMPERATURE: 98 F | OXYGEN SATURATION: 97 % | WEIGHT: 155.63 LBS | SYSTOLIC BLOOD PRESSURE: 98 MMHG | BODY MASS INDEX: 23.59 KG/M2 | HEART RATE: 74 BPM | DIASTOLIC BLOOD PRESSURE: 56 MMHG | HEIGHT: 68 IN | RESPIRATION RATE: 18 BRPM

## 2023-07-28 PROBLEM — Z01.818 PRE-OP TESTING: Status: ACTIVE | Noted: 2023-07-28

## 2023-07-28 LAB
ALBUMIN SERPL-MCNC: 3.4 G/DL (ref 3.4–5)
ALBUMIN/GLOB SERPL: 1 {RATIO} (ref 1–2)
ALP LIVER SERPL-CCNC: 64 U/L
ALT SERPL-CCNC: 19 U/L
ANION GAP SERPL CALC-SCNC: 6 MMOL/L (ref 0–18)
AST SERPL-CCNC: 10 U/L (ref 15–37)
BASOPHILS # BLD AUTO: 0.01 X10(3) UL (ref 0–0.2)
BASOPHILS NFR BLD AUTO: 0.1 %
BILIRUB SERPL-MCNC: 0.9 MG/DL (ref 0.1–2)
BUN BLD-MCNC: 26 MG/DL (ref 7–18)
CALCIUM BLD-MCNC: 8.7 MG/DL (ref 8.5–10.1)
CHLORIDE SERPL-SCNC: 104 MMOL/L (ref 98–112)
CO2 SERPL-SCNC: 26 MMOL/L (ref 21–32)
CREAT BLD-MCNC: 1.14 MG/DL
EGFRCR SERPLBLD CKD-EPI 2021: 51 ML/MIN/1.73M2 (ref 60–?)
EOSINOPHIL # BLD AUTO: 0 X10(3) UL (ref 0–0.7)
EOSINOPHIL NFR BLD AUTO: 0 %
ERYTHROCYTE [DISTWIDTH] IN BLOOD BY AUTOMATED COUNT: 12.3 %
GLOBULIN PLAS-MCNC: 3.4 G/DL (ref 2.8–4.4)
GLUCOSE BLD-MCNC: 124 MG/DL (ref 70–99)
HCT VFR BLD AUTO: 36.2 %
HGB BLD-MCNC: 12.1 G/DL
IMM GRANULOCYTES # BLD AUTO: 0.04 X10(3) UL (ref 0–1)
IMM GRANULOCYTES NFR BLD: 0.4 %
LYMPHOCYTES # BLD AUTO: 1.13 X10(3) UL (ref 1–4)
LYMPHOCYTES NFR BLD AUTO: 11.8 %
MCH RBC QN AUTO: 31.4 PG (ref 26–34)
MCHC RBC AUTO-ENTMCNC: 33.4 G/DL (ref 31–37)
MCV RBC AUTO: 94 FL
MONOCYTES # BLD AUTO: 0.76 X10(3) UL (ref 0.1–1)
MONOCYTES NFR BLD AUTO: 7.9 %
NEUTROPHILS # BLD AUTO: 7.64 X10 (3) UL (ref 1.5–7.7)
NEUTROPHILS # BLD AUTO: 7.64 X10(3) UL (ref 1.5–7.7)
NEUTROPHILS NFR BLD AUTO: 79.8 %
OSMOLALITY SERPL CALC.SUM OF ELEC: 288 MOSM/KG (ref 275–295)
PLATELET # BLD AUTO: 245 10(3)UL (ref 150–450)
POTASSIUM SERPL-SCNC: 4.3 MMOL/L (ref 3.5–5.1)
PROT SERPL-MCNC: 6.8 G/DL (ref 6.4–8.2)
RBC # BLD AUTO: 3.85 X10(6)UL
SODIUM SERPL-SCNC: 136 MMOL/L (ref 136–145)
WBC # BLD AUTO: 9.6 X10(3) UL (ref 4–11)

## 2023-07-28 PROCEDURE — 74177 CT ABD & PELVIS W/CONTRAST: CPT | Performed by: OBSTETRICS & GYNECOLOGY

## 2023-07-28 RX ORDER — PHENAZOPYRIDINE HYDROCHLORIDE 100 MG/1
100 TABLET, FILM COATED ORAL 2 TIMES DAILY PRN
Qty: 10 TABLET | Refills: 0 | Status: SHIPPED | OUTPATIENT
Start: 2023-07-28

## 2023-07-28 RX ORDER — PHENAZOPYRIDINE HYDROCHLORIDE 100 MG/1
100 TABLET, FILM COATED ORAL 2 TIMES DAILY PRN
Status: DISCONTINUED | OUTPATIENT
Start: 2023-07-28 | End: 2023-07-28

## 2023-07-28 NOTE — OPERATIVE REPORT
[facility]    PATIENT'S NAME: Alexandria Mix   ATTENDING PHYSICIAN: Mohan Bates M.D. OPERATING PHYSICIAN: Mohan Bates M.D. PATIENT ACCOUNT#:   [de-identified]    LOCATION:  OR  OR [facility]  MEDICAL RECORD #:   [id number]       YOB: 1950  ADMISSION DATE:       07/27/2023      OPERATION DATE:  07/27/2023    OPERATIVE REPORT    PREOPERATIVE DIAGNOSIS:  Prolapse. POSTOPERATIVE DIAGNOSIS:  Prolapse. PROCEDURE:      ASSISTANT SURGEON:  Luis Beltran M.D.     ESTIMATED BLOOD LOSS:  From this part of procedure 50 mL. COMPLICATIONS:  None. OPERATIVE TECHNIQUE:  The patient was prepped and draped in the usual sterile manner. Exam under anesthesia after the bladder was emptied noted a free pelvic mass approximately 6 to 8 cm. Ankush-Synephrine was injected around the cervix, the circumferential incision around the cervix. The cul-de-sac was entered and was free. Bladder was dissected from the cervix. Anterior cul-de-sac was entered and noted to be free. There was a large, apparently ovarian cyst that was smooth-walled on the part we could see. Uterosacral ligaments were clamped, cut, and ligated. Cardinal ligaments were clamped, cut, and ligated. Uterine arteries were clamped, cut, and ligated. The uterus and the cervix were removed. The left tube and ovary were visualized and appeared to be normal and were removed.       Dictated By Mohan Bates M.D.  d: 07/27/2023 15:08:38  t: 07/27/2023 16:45:05  Job [id number]  KLD/

## 2023-07-28 NOTE — PLAN OF CARE
Problem: Patient/Family Goals  Goal: Patient/Family Long Term Goal  Description: Patient's Long Term Goal: go home    Interventions:  - IV fluids  -Pain management  -VS q 4 hrs  -Monitor labs  - See additional Care Plan goals for specific interventions  Outcome: Not Progressing  Goal: Patient/Family Short Term Goal  Description: Patient's Short Term Goal: have tolerable pain    Interventions:   - give pain meds as needed    - See additional Care Plan goals for specific interventions  Outcome: Progressing     Problem: PAIN - ADULT  Goal: Verbalizes/displays adequate comfort level or patient's stated pain goal  Description: INTERVENTIONS:  - Encourage pt to monitor pain and request assistance  - Assess pain using appropriate pain scale  - Administer analgesics based on type and severity of pain and evaluate response  - Implement non-pharmacological measures as appropriate and evaluate response  - Consider cultural and social influences on pain and pain management  - Manage/alleviate anxiety  - Utilize distraction and/or relaxation techniques  - Monitor for opioid side effects  - Notify MD/LIP if interventions unsuccessful or patient reports new pain  - Anticipate increased pain with activity and pre-medicate as appropriate  Outcome: Progressing     Problem: SAFETY ADULT - FALL  Goal: Free from fall injury  Description: INTERVENTIONS:  - Assess pt frequently for physical needs  - Identify cognitive and physical deficits and behaviors that affect risk of falls.   - [place] fall precautions as indicated by assessment.  - Educate pt/family on patient safety including physical limitations  - Instruct pt to call for assistance with activity based on assessment  - Modify environment to reduce risk of injury  - Provide assistive devices as appropriate  - Consider OT/PT consult to assist with strengthening/mobility  - Encourage toileting schedule  Outcome: Progressing

## 2023-07-28 NOTE — PROGRESS NOTES
A&Ox4. VSS. RA.   GI: Abdomen soft, nondistended. Denies nausea. : dolan catheter draining clear yellow urine, patient states she still feels some pressure,but improved from last night. Gila pad CDI  Pain controlled with PRN pain medications  Up with standby assist.  Incisions:lap sites x 2 CDI  Diet:general  IVF running per order. All appropriate safety measures in place. All questions and concerns addressed.  Will continue to monitor

## 2023-07-28 NOTE — PROGRESS NOTES
2330:Pt c/o lower pressure. Noted dolan with slow drainage, tried repositionning pt and standing but not much urine drained. . On call OB paged, ok to manually irrigate and if issue continue to page Dr Tabatha Liao. 1254:Unable to get hold of Dr Tabatha Liao via perfect serve or her cell phone, left voicemail    0145:Urology consulted, Nathaly Garcia on call, no new order, referred back to OB/Gyn and instructed not to reach back tonight    0255: Dolan exchange per OB/gyn order    0415:Dr Ospina at bedside to see pt. Stat CT scan ordered    0430:Pt taken for CT scan    0455:Pt back to floor    0600:Pt in so much discomfort, feels need to urinate but dolan not draining, abdomen super distended.  Pain meds given and trying to get hold of Dr Tabatha Liao

## 2023-07-28 NOTE — DISCHARGE INSTRUCTIONS
Pelvic Medicine Postoperative Instructions:    1. AVOID CONSTIPATION:   -Take Miralax one capful in water or juice each morning. You can also take each evening if needed.  -Take Fiber supplement along with Miralax as well. 2. No lifting over 10 pounds (1 gallon of mild is 8 pounds). 3. Showers and tub baths are okay, even if you have a catheter or abdominal incision. 4. You may ride in a car immediately. 5. You may drive after 1-2 weeks. Please call us for any of the following:  -Temperature above 100.5 for 4 hours or above 101.0 at any time.  -Chest pain or trouble breathing.  -Vaginal bleeding heavier than a period.  -Redness, tenderness or swelling of your legs  -Pain or burning when you urinate; or if you go home with a catheter, trouble with catheter draining.  -Redness, pain or foul discharge from incision.     Office telephone: [phone]  After hours: [phone]

## 2023-07-28 NOTE — PROGRESS NOTES
NURSING ADMISSION NOTE      Patient admitted via BED from PACU  Oriented to room. Safety precautions initiated. Bed in low position. Call light in reach. Alert & oriented x4. With tolerable pain . No nausea or vomiting. With 2 incisions sites with dermabond on pubic area c/d/I. Peripad with scant blood in it. Ellis catheter intact. Assessment done Plan  of care discussed with patient. Will monitor.

## 2023-07-28 NOTE — OPERATIVE REPORT
Chuy Kovacs   : 1950  MRN: [id number]  Date of Surgery: 2023    Pre-operative diagnosis:  1. Stage 4 uterovaginal prolapse, cystocele, rectocele. 2. Stress urinary incontinence. Post-operative diagnosis:  1. Stage 4 uterovaginal prolapse, cystocele, rectocele, enterocele. 2. Stress urinary incontinence. 3. Right adnexal mass. Procedures:  Repair of enterocele; bilateral uterosacral ligament suspension; anterior colporrhaphy; posterior colporrhaphy; retropubic midurethral sling; cystoscopy. Surgeon:  Caleb Lucas MD    Assistant: ANUP Ibarrabutch 75, OR 9     Anesthesia:  General endotracheal     Estimated blood loss:  150 ml     Findings: Large 10-15 right adnexal mass, multiloculated, non mobile. Stage 4 uterovaginal prolapse with associated cystocele, rectocele and enterocele. Bilateral ureteral efflux. Hemostasis upon completion of the case. Complications: None    Specimens: None for my portion of the case. Description of the Procedure: The patient remained in dorsal lithotomy position prepped and draped under general anesthesia after Dr. Tawny Zazueta completed the vaginal hysterectomy and left salpingo-oophorectomy and I assisted her with that portion of the procedure. Then I took over as primary surgeon. Decision was made not to pursue excision of right adnexal mass and to evaluate with imaging and referral to [address] [facility]. At this point Dr. Tawny Zazueta scrubbed out of the case and I continued with prolapse repair. Due to the presence of apical prolapse and associated enterocele, it was necessary to proceed with suspension of the vaginal apex and enterocele repair. A lap sponge was placed into the peritoneal cavity and the uterosacral ligaments identified. A Butcher culdoplasty was performed.  Utilizing 1-0 Vicryl suture, a Butcher stitch was placed through the posterior vaginal wall, into the distal left uterosacral ligament, across the posterior cul-de-sac peritoneum, through the distal right uterosacral ligament and back through the posterior vagina in order to repair the enterocele. This suture was held in tension. Following this, two uterosacral suspension stitches were then placed using 1-0 Vicryl sutures on each side of the pelvis. These were placed high on the uterosacral ligaments at and just proximal to the ischial spine. Once these sutures were placed and held in tension, cystoscopy was performed with a 70-degree scope. The patient was given 5 mL of indigo carmine IV dye. Cystoscopy confirmed that there had been no injury to the bladder. There was noted to be prompt flow of blue-stained urine from both ureteral orifices, confirming ureteral patency. There were no bladder lesions. Following this, Allis clamps were used to grasp the redundant anterior vaginal epithelium in the midline and a midline incision was made after infiltration of Marcaine with epinephrine. The redundant epithelium was dissected from the underlying endopelvic connective tissue of the bladder and 0 Vicryl sutures were then used to plicate the endopelvic connective tissue, obliterating the  cystocele. The excess vaginal epithelium was excised and the midline incision was then closed with 2-0 Vicryl suture in running locking fashion. The lap sponge was removed from the peritoneal cavity and the vaginal apex was then closed with 2-0 Vicryl suture in a running locking fashion after hemostasis was verified once again. The uterosacral and Butcher sutures were then tied down resulting in excellent elevation of the vaginal apex and correction of the enterocele. Allis clamps were then used to grasp the vaginal epithelium suburethrally and a scalpel was used to make a separate 2 cm midline incision after infiltration of Marcaine with epinephrine. The vaginal epithelium was dissected off the underlying periurethral tissue.  The Advantage fit trocar was passed from the vaginal side to the retropubic side bilaterally. Exit markings had been made on the skin previously. Cystoscopy was repeated. Ureteral patency was confirmed once again. There was no evidence of intravesical lesion, foreign body or bladder perforation. The sling was then placed in the midurethra in a tension free fashion. There was no evidence of tunneling of the vaginal mucosa. The excess sling was trimmed, and the vaginal mucosa was closed in the midline with running 2-0 Vicryl suture. Skin incisions were reapproximated with skin glue. Attention was then directed to the posterior compartment. The redundant posterior vaginal epithelium was excised. Then utilizing interrupted No. 0 Vicryl; the endopelvic fibromuscular tissue was plicated in the midline in the standard fashion. The vaginal epithelium was reaproximated with 2-0 Vicryl in a running fashion. She had a well-supported perineal body. Examination confirmed that all pelvic floor defects were addressed. A Ellis catheter was placed transurethrally. Rectal examination was intact. The patient was then removed from the dorsal lithotomy position, awakened and extubated and transferred from the operating room to the recovery room in stable condition, having tolerated the procedure well. Sponge, lap, & needle counts were correct.      Doreen Hay MD

## 2023-07-28 NOTE — DISCHARGE SUMMARY
[address]    PATIENT'S NAME: Andrea Bullock   ATTENDING PHYSICIAN: Laura Desai M.D. PATIENT ACCOUNT#:   [de-identified]    LOCATION:  [address] [place] [address]  MEDICAL RECORD #:   [id number]       YOB: 1950  ADMISSION DATE:       07/27/2023      DISCHARGE DATE:  07/28/2023    DISCHARGE SUMMARY      HISTORY AND HOSPITAL COURSE:  She was admitted yesterday for a vaginal hysterectomy and left BSO. A large pelvic mass was noted. She had a CAT scan which showed the mass probably to be an ovarian. Her  was 19. Creatinine was 1.14. Hemoglobin was 12.1. Pathology is pending. She also had repair of an enterocele, bilateral uterosacral ligament suspension, anterior colporrhaphy and posterior repair, retropubic urethral mid sling. These were done by Gynecologic Urology. She did well except last night the Ellis was not draining, so she got a fluid bolus and got the Ellis replaced. She is now comfortable, is not taking any opiates for pain. Minimal vaginal bleeding. DISCHARGE INSTRUCTIONS:  She will follow up with gynecologic oncologist and Dr. Ashwini Raymundo in 1 week. She can follow up with me in 4 to 6 weeks whenever it is convenient. Instructions given.       Dictated By Laura Desai M.D.  d: 07/28/2023 13:20:15  t: 07/28/2023 16:11:38  [facility] [id number]  Pending sale to [facility]/

## 2023-07-28 NOTE — PROGRESS NOTES
NURSING DISCHARGE NOTE    Discharged Home via Wheelchair. Accompanied by RN. Belongings Taken by patient/family.

## 2023-07-28 NOTE — PROGRESS NOTES
Speaking with Dr Yamilex Chadwick, order to discontinue the urology consult, give the pt a bolus and she will see the pt this morning.

## 2023-07-31 ENCOUNTER — TELEPHONE (OUTPATIENT)
Facility: CLINIC | Age: 73
End: 2023-07-31

## 2023-07-31 NOTE — TELEPHONE ENCOUNTER
Spoke with patient. Dr Jennifer Tolliver requested this patient to receive Dr Brady Ashton contact information. Information provided in her Mychart. Patient aware and verbalized understanding.

## 2023-08-03 ENCOUNTER — OFFICE VISIT (OUTPATIENT)
Dept: UROLOGY | Facility: CLINIC | Age: 73
End: 2023-08-03
Attending: OBSTETRICS & GYNECOLOGY
Payer: MEDICARE

## 2023-08-03 VITALS — HEIGHT: 65 IN | WEIGHT: 155 LBS | BODY MASS INDEX: 25.83 KG/M2 | TEMPERATURE: 98 F

## 2023-08-03 DIAGNOSIS — R33.8 POSTOPERATIVE RETENTION OF URINE: Primary | ICD-10-CM

## 2023-08-03 DIAGNOSIS — N99.89 POSTOPERATIVE RETENTION OF URINE: Primary | ICD-10-CM

## 2023-08-03 PROCEDURE — 99212 OFFICE O/P EST SF 10 MIN: CPT

## 2023-08-03 NOTE — PATIENT INSTRUCTIONS
Voiding Trial Instructions  You have passed your voiding trial at 8:30am.  Please make sure you are drinking some water today. You can take your Motrin to help with any swelling from the catheter. It is important to try and empty your bladder every two hours during the day. Try and empty again at 10:30am.  If you are unable to empty, try and drink a glass of water and try again 30-60 minutes later. If you have been unable to empty your bladder by 12:30pm, which is 4 hours after leaving the office, you will most likely be uncomfortable and you will need to come back into the office. If it is after 4pm, go to an urgent care or emergency room to have a catheter placed again. Please call our office at [phone] if you have any questions or concerns.

## 2023-08-03 NOTE — PROGRESS NOTES
Spoke with patient, states she was able to void at 10:30am and again about 12:30 pm. Patient states that she voided freely and has no problems. Reviewed s/s of urinary retention and UTI symptoms and invited the patient to call office with any questions. Patient verbalized understanding and all questions were answered.

## 2023-08-03 NOTE — PROCEDURES
S:   Patient here for voiding trial following surgery  Procedure Date:   Procedure Name: Anterior and Posterior Repair, Cystoscopy, Mid-urethral Sling, Uterosacral Ligament Suspension, Vaginal Hysterectomy, and Other Left salpingoophorectomy   Doing well, no complaints  BMs regular using Benefiber and Miralax   Pain well controlled using Ibuprofen and Other phenazopyridine    Denies s/sx of UTI   Tolerates ambulation & diet well     O:   There were no vitals filed for this visit. Gen: NAD  Pulm: nl effort  : No active bleeding. Discharge wnl  Surgical sites-WNL  Dolan intact and draining clear yellow urine. Disconnected dolan catheter from drainage tubing. Using a 60 cc Ko syringe, 300ml sterile water was instilled per gravity. Balloon deflated using 10 cc syringe, and catheter removed. Pt up to bathroom and voided 200mL plus leakage on the way to the bathroom. A 16 Slovenian dolan catheter was placed and patient had a PVR of 125. Findings discussed and evaluated with MARTIN Pierce RN. Patient passes voiding trial.    Patient  tolerated procedure well. A:   Postoperative retention of urine  (primary encounter diagnosis)    P:  Patient passed voiding trial-instructed to call in early afternoon for voiding update. Office number provided. Reviewed post op restrictions and bowel management  Reviewed signs and sx of urinary retention and UTI. Discussed return to work/activity plans  Follow-up with Dr. Romelia Curiel in 5 weeks.    All questions answered  She understands and agrees to plan

## 2023-08-16 ENCOUNTER — TELEPHONE (OUTPATIENT)
Dept: UROLOGY | Facility: CLINIC | Age: 73
End: 2023-08-16

## 2023-08-16 NOTE — TELEPHONE ENCOUNTER
TC to pt per Dr Alice Hodge request to see if she made appt w/ Dr Sulma Vasquez. Pt reports she did and she already has surgery scheduled for next Wed.

## 2023-09-13 ENCOUNTER — OFFICE VISIT (OUTPATIENT)
Dept: UROLOGY | Facility: CLINIC | Age: 73
End: 2023-09-13
Attending: OBSTETRICS & GYNECOLOGY
Payer: MEDICARE

## 2023-09-13 VITALS — WEIGHT: 155 LBS | HEIGHT: 65 IN | BODY MASS INDEX: 25.83 KG/M2 | RESPIRATION RATE: 20 BRPM

## 2023-09-13 DIAGNOSIS — N39.41 URGE INCONTINENCE: ICD-10-CM

## 2023-09-13 DIAGNOSIS — Z98.890 POST-OPERATIVE STATE: Primary | ICD-10-CM

## 2023-09-13 PROCEDURE — 99212 OFFICE O/P EST SF 10 MIN: CPT

## 2023-09-13 RX ORDER — TROSPIUM CHLORIDE 20 MG/1
20 TABLET, FILM COATED ORAL NIGHTLY
Qty: 90 TABLET | Refills: 3 | Status: SHIPPED | OUTPATIENT
Start: 2023-09-13

## 2023-09-15 ENCOUNTER — TELEPHONE (OUTPATIENT)
Facility: CLINIC | Age: 73
End: 2023-09-15

## 2023-10-02 ENCOUNTER — OFFICE VISIT (OUTPATIENT)
Facility: CLINIC | Age: 73
End: 2023-10-02
Payer: MEDICARE

## 2023-10-02 VITALS
DIASTOLIC BLOOD PRESSURE: 84 MMHG | BODY MASS INDEX: 24.99 KG/M2 | HEIGHT: 65 IN | HEART RATE: 88 BPM | WEIGHT: 150 LBS | SYSTOLIC BLOOD PRESSURE: 130 MMHG

## 2023-10-02 DIAGNOSIS — N81.4 UTERINE PROLAPSE: Primary | ICD-10-CM

## 2023-10-02 NOTE — PROGRESS NOTES
He has seen-year-old GYN for follow-up no problems urinating bowel movements normal.  She is healing up well no bleeding no pain. Her abdominal incision was also healing well.

## 2023-12-20 ENCOUNTER — OFFICE VISIT (OUTPATIENT)
Dept: UROLOGY | Facility: CLINIC | Age: 73
End: 2023-12-20
Attending: OBSTETRICS & GYNECOLOGY
Payer: MEDICARE

## 2023-12-20 VITALS
DIASTOLIC BLOOD PRESSURE: 64 MMHG | TEMPERATURE: 98 F | HEIGHT: 65 IN | WEIGHT: 146 LBS | BODY MASS INDEX: 24.32 KG/M2 | SYSTOLIC BLOOD PRESSURE: 114 MMHG | RESPIRATION RATE: 18 BRPM

## 2023-12-20 DIAGNOSIS — Z98.890 POST-OPERATIVE STATE: Primary | ICD-10-CM

## 2023-12-20 DIAGNOSIS — N39.41 URGE INCONTINENCE: ICD-10-CM

## 2023-12-20 DIAGNOSIS — R35.1 NOCTURIA: ICD-10-CM

## 2023-12-20 PROCEDURE — 99212 OFFICE O/P EST SF 10 MIN: CPT

## 2024-07-23 ENCOUNTER — TELEPHONE (OUTPATIENT)
Dept: UROLOGY | Facility: CLINIC | Age: 74
End: 2024-07-23

## 2024-10-23 ENCOUNTER — OFFICE VISIT (OUTPATIENT)
Dept: UROLOGY | Facility: CLINIC | Age: 74
End: 2024-10-23
Attending: OBSTETRICS & GYNECOLOGY
Payer: MEDICARE

## 2024-10-23 VITALS
TEMPERATURE: 98 F | WEIGHT: 142 LBS | BODY MASS INDEX: 24 KG/M2 | SYSTOLIC BLOOD PRESSURE: 140 MMHG | DIASTOLIC BLOOD PRESSURE: 78 MMHG

## 2024-10-23 DIAGNOSIS — N39.41 URGE INCONTINENCE: Primary | ICD-10-CM

## 2024-10-23 PROBLEM — N81.3 COMPLETE UTEROVAGINAL PROLAPSE: Status: RESOLVED | Noted: 2023-05-17 | Resolved: 2024-10-23

## 2024-10-23 PROBLEM — N81.4 UTERINE PROLAPSE: Status: RESOLVED | Noted: 2021-03-10 | Resolved: 2024-10-23

## 2024-10-23 PROBLEM — N39.3 URINARY, INCONTINENCE, STRESS FEMALE: Status: RESOLVED | Noted: 2023-05-17 | Resolved: 2024-10-23

## 2024-10-23 PROBLEM — N81.11 CYSTOCELE, MIDLINE: Status: RESOLVED | Noted: 2023-05-17 | Resolved: 2024-10-23

## 2024-10-23 PROBLEM — N81.6 RECTOCELE: Status: RESOLVED | Noted: 2023-05-17 | Resolved: 2024-10-23

## 2024-10-23 PROCEDURE — 99212 OFFICE O/P EST SF 10 MIN: CPT

## 2024-10-23 RX ORDER — TROSPIUM CHLORIDE 20 MG/1
20 TABLET, FILM COATED ORAL NIGHTLY
Qty: 90 TABLET | Refills: 3 | Status: SHIPPED | OUTPATIENT
Start: 2024-10-23

## 2024-11-14 ENCOUNTER — OFFICE VISIT (OUTPATIENT)
Facility: CLINIC | Age: 74
End: 2024-11-14
Payer: MEDICARE

## 2024-11-14 VITALS
WEIGHT: 143.63 LBS | HEIGHT: 66 IN | SYSTOLIC BLOOD PRESSURE: 124 MMHG | HEART RATE: 86 BPM | DIASTOLIC BLOOD PRESSURE: 72 MMHG | BODY MASS INDEX: 23.08 KG/M2

## 2024-11-14 DIAGNOSIS — Z12.31 ENCOUNTER FOR SCREENING MAMMOGRAM FOR BREAST CANCER: Primary | ICD-10-CM

## 2024-11-14 PROCEDURE — G0101 CA SCREEN;PELVIC/BREAST EXAM: HCPCS | Performed by: OBSTETRICS & GYNECOLOGY

## 2024-11-14 NOTE — PROGRESS NOTES
Radha Wells is a 74 year old female  No LMP recorded. Patient is postmenopausal.   Chief Complaint   Patient presents with    Wellness Visit   .     She is not losing urine.  No vaginal bleeding.  He had a DEXA 2 years ago with mild osteopenia.    When she gets up in the morning she has to rush to the bathroom she is on a medicine for that she still sees uro-GYN.    Blood work is done with her primary.    OBSTETRICS HISTORY:  OB History    Para Term  AB Living   1 1 1     1   SAB IAB Ectopic Multiple Live Births           1      # Outcome Date GA Lbr Placido/2nd Weight Sex Type Anes PTL Lv   1 Term  40w0d  7 lb 4 oz (3.289 kg) F NORMAL SPONT   NEGIN       GYNE HISTORY:  Periods none due to hysterectomy    History   Sexual Activity    Sexual activity: Not Currently    Partners: Male        Pap Date: 22  Pap Result Notes: neative        MEDICAL HISTORY:  Past Medical History:    Adnexal mass    Anxiety    Arthritis    Disorder of thyroid    Diverticulosis    Esophageal reflux    GERD    Heartburn    Hemorrhoids    High blood pressure    High cholesterol    HYPERLIPIDEMIA    HYPERTENSION    HYPOTHYROIDISM    MENOPAUSE    Osteoarthrosis, unspecified whether generalized or localized, unspecified site    OSTEOPOROSIS    Status post vaginal hysterectomy    Thyroid disease    Unspecified essential hypertension    Wears glasses       SURGICAL HISTORY:  Past Surgical History:   Procedure Laterality Date    Anterior repair  2023    Colonoscopy      Colonoscopy      Enterocele repair  2023    Hysterectomy  2023    VH w/ prolapse repairs    Midurethral sling  2023          x1    Other surgical history      LYMPH NODE REMOVAL - BACK OF HEAD    Other surgical history      exploratory laparotomy and removal of the pelvic mass w/ final pathology showing  to be mucinous cystadenoma. Dr. Cristobal Dacosta    Posterior repair  2023    Tonsillectomy      Uterosacral  ligament suspension  07/27/2023       SOCIAL HISTORY:  Social History     Socioeconomic History    Marital status:      Spouse name: Not on file    Number of children: Not on file    Years of education: Not on file    Highest education level: Not on file   Occupational History    Not on file   Tobacco Use    Smoking status: Never    Smokeless tobacco: Never   Vaping Use    Vaping status: Never Used   Substance and Sexual Activity    Alcohol use: No    Drug use: No    Sexual activity: Not Currently     Partners: Male   Other Topics Concern    Not on file   Social History Narrative    Not on file     Social Drivers of Health     Financial Resource Strain: Not on file   Food Insecurity: Not on file   Transportation Needs: Not on file   Physical Activity: Not on file   Stress: Not on file   Social Connections: Not on file   Housing Stability: At Risk (8/18/2023)    Received from [facility]     Living Situation: Not on file     Housing Problems: Not on file       FAMILY HISTORY:  Family History   Problem Relation Age of Onset    Heart Disorder Mother     Heart Disease Mother     Heart Attack Mother     Heart Disorder Maternal Grandmother     Heart Disease Maternal Grandmother     Heart Disorder Maternal Grandfather     Heart Disease Maternal Grandfather     Heart Attack Maternal Grandfather     Heart Disorder Paternal Grandmother     Heart Disease Paternal Grandmother     Heart Disorder Paternal Grandfather     Cancer Paternal Grandfather     Colon Cancer Paternal Grandfather     Ulcerative Colitis Father     Heart Disorder Sister         heart valve    Diabetes Sister         on dialysis    Stroke Neg        MEDICATIONS:    Current Outpatient Medications:     Trospium Chloride 20 MG Oral Tab, Take 1 tablet (20 mg total) by mouth at bedtime., Disp: 90 tablet, Rfl: 3    hydroCHLOROthiazide 25 MG Oral Tab, Take 1 tablet (25 mg total) by mouth daily., Disp: , Rfl:     lisinopril 20 MG Oral Tab, Take  1 tablet (20 mg total) by mouth daily., Disp: , Rfl:     OMEPRAZOLE 20 MG Oral Capsule Delayed Release, TAKE 1 CAPSULE BY MOUTH IN THE MORNING BEFORE BREAKFAST, Disp: 90 capsule, Rfl: 3    LEVOTHYROXINE 50 MCG Oral Tab, TAKE 1 TABLET BY MOUTH EVERY DAY, Disp: 90 tablet, Rfl: 3    atorvastatin 10 MG Oral Tab, TAKE 1 TABLET BY MOUTH IN THE EVENING, Disp: 90 tablet, Rfl: 3    PARoxetine 20 MG Oral Tab, TAKE 1 TABLET BY MOUTH ONCE DAILY IN THE MORNING, Disp: 90 tablet, Rfl: 3    SUMAtriptan 25 MG Oral Tab, Take 1 tablet (25 mg total) by mouth every 2 (two) hours as needed for Migraine., Disp: 9 tablet, Rfl: 1    ALPRAZolam 0.25 MG Oral Tab, TAKE ONE TABLET BY MOUTH TWICE DAILY AS NEEDED FOR ANXIETY, Disp: 20 tablet, Rfl: 0    VITAMIN D OR, 1 TABLET DAILY, Disp: , Rfl:     ALLERGIES:  Allergies[1]      Review of Systems:  Constitutional:  Denies fatigue, night sweats, hot flashes  Gastrointestinal:  denies heartburn, abdominal pain, diarrhea or constipation  Genitourinary:  denies dysuria, incontinence, abnormal vaginal discharge, vaginal itching  Skin/Breast:  Denies any breast pain, lumps, or discharge.   Neurological:  denies headaches, extremity weakness or numbness.      PHYSICAL EXAM:   Constitutional: well developed, well nourished  Head/Face: normocephalic  Neck/Thyroid: thyroid symmetric, no thyromegaly, no nodules, no adenopathy  Breast: normal without palpable masses, tenderness, asymmetry, nipple discharge, nipple retraction or skin changes  Abdomen:  soft, nontender, nondistended, no masses  Skin/Hair: no unusual rashes or bruises   Extremities: no edema, no cyanosis  Psychiatric:  Oriented to time, place, person and situation. Appropriate mood and affect    Pelvic Exam:  External Genitalia: normal appearance, hair distribution, and no lesions  Urethral Meatus:  normal in size, location, without lesions and prolapse  Bladder:  No fullness, masses or tenderness  Vagina:  Normal appearance without lesions, no  abnormal discharge  Cervix: Absent  Uterus: Absent  Adnexa: normal without masses or tenderness  Perineum: normal  Anus: no hemorroids     Assessment & Plan:  Mahnaz was seen today for wellness visit.    Diagnoses and all orders for this visit:    Encounter for screening mammogram for breast cancer  -     [facility] EMI 2D+3D SCREENING BILAT (CPT=77067/46816); Future        Osteopenia urgency incontinence             [1]   Allergies  Allergen Reactions    Ciprofloxacin UNKNOWN    Penicillins      NAIL FUNGUS

## 2024-12-18 ENCOUNTER — HOSPITAL ENCOUNTER (OUTPATIENT)
Dept: MAMMOGRAPHY | Facility: HOSPITAL | Age: 74
Discharge: HOME | End: 2024-12-18
Attending: OBSTETRICS & GYNECOLOGY
Payer: MEDICARE

## 2024-12-18 DIAGNOSIS — Z12.31 ENCOUNTER FOR SCREENING MAMMOGRAM FOR BREAST CANCER: ICD-10-CM

## 2024-12-18 PROCEDURE — 77067 SCR MAMMO BI INCL CAD: CPT | Performed by: OBSTETRICS & GYNECOLOGY

## 2024-12-18 PROCEDURE — 77063 BREAST TOMOSYNTHESIS BI: CPT | Performed by: OBSTETRICS & GYNECOLOGY

## 2025-01-21 NOTE — PROGRESS NOTES
Radha Wells  2/8/1950 1/22/25    Chief Complaint   Patient presents with    Follow - Up     3 month urge incontinence f/u       HPI:  The patient is a 74 year-old female who is status post TVH, LSO per Dr Lynne, followed by USLS, APE repairs, sling, cysto on 7/27/23. Surgery was indicated secondary to stage 4 uterovaginal prolapse and JOLYNN. Surgery complicated by incidental finding of large adnexal mass. She passed her voiding trial.  Was referred to see Dr. Dacosta and she had exp lap with removal of right adnexal mass and MARICRUZ on 8/23/23.  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   Last visit on 10/23/24. Has residua UUI. Prescribed Trospium 20 mg PO daily with improvement.  past away last fall.  Agreed to take Trospium in the evenings. Returns for follow up.     Interval history:  Continues to take Trospium 20 mg PO in the evenings.  Doing more consistently.  Voids every 3-4 hours during the day and once at night.  UUI only first thing in am, but less than before.  No JOLYNN  No bulge  No UTIs  Feels she empties well.   Bowels are regular. Uses Benefiber daily.        Objective:  /62   Temp 97.4 °F (36.3 °C)   Ht 66\"   Wt 143 lb (64.9 kg)   BMI 23.08 kg/m²     Gen: Alert and oriented, no acute distress  Respiratory: Normal respiratory effort  Abdomen: non-tender, non distended.   Pelvic:  Cough stress test: Negative in the supine position.  External genitalia: Normal for age.   Vagina: + Atrophy. No lesions.  No granulation tissue, no mesh exposure.   Support: Excellent, no residual prolapse noted.   Tenderness: None  Pelvic floor muscle strength: 2/5. Instructed on kegels.     Impression:  Encounter Diagnoses   Name Primary?    Urge incontinence Yes    Vaginal atrophy        Plan:  Continue Trospium 20 mg PO in the evenings.  Instructed on kegel exercises and urge suppression.  Follow up in 1 year or sooner prn.       Velma  MD Tito, FACOG, FACS  Female Pelvic Medicine and  Reconstructive Surgery (Urogynecology)

## 2025-01-22 ENCOUNTER — OFFICE VISIT (OUTPATIENT)
Dept: UROLOGY | Facility: CLINIC | Age: 75
End: 2025-01-22
Attending: OBSTETRICS & GYNECOLOGY
Payer: MEDICARE

## 2025-01-22 VITALS
HEIGHT: 66 IN | WEIGHT: 143 LBS | BODY MASS INDEX: 22.98 KG/M2 | SYSTOLIC BLOOD PRESSURE: 118 MMHG | DIASTOLIC BLOOD PRESSURE: 62 MMHG | TEMPERATURE: 97 F

## 2025-01-22 DIAGNOSIS — N39.41 URGE INCONTINENCE: Primary | ICD-10-CM

## 2025-01-22 DIAGNOSIS — N95.2 VAGINAL ATROPHY: ICD-10-CM

## 2025-01-22 PROBLEM — R35.1 NOCTURIA: Status: RESOLVED | Noted: 2023-12-20 | Resolved: 2025-01-22

## 2025-01-22 PROCEDURE — 99212 OFFICE O/P EST SF 10 MIN: CPT

## 2025-03-17 NOTE — PROGRESS NOTES
Radha Wells  2/8/1950  10/23/24    Chief Complaint   Patient presents with    Post-Op     Annual post op visit sx 7/27/23       HPI:  The patient is a 73 year-old female who is status post TVH, LSO per Dr Lynne, followed by USLS, APE repairs, sling, cysto on 7/27/23. Surgery was indicated secondary to stage 4 uterovaginal prolapse and JOLYNN. Surgery complicated by incidental finding of large adnexal mass. She passed her voiding trial.  Was referred to see Dr. Dacosta and she had exp lap with removal of right adnexal mass and MARICRUZ on 8/23/23.  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 She was last seen in the office on 9/13/24 for interval visit. Had some residual UUI. Prescribed Trospium. 20 mg PO daily with improvement. Returns for one year post op.     Interval history:  Currently grieving the passing of her  just a month ago.   Not sleeping very well.   No prolapse. No JOLYNN  Has UUI first thing in the am. Continues to take Trospium 20 mg at nighttime. Has a little dry mouth.  No UTIs.  Bowels are regular.  Daughter is supportive.      Objective:  /78   Temp 98 °F (36.7 °C)   Wt 142 lb (64.4 kg)   BMI 23.63 kg/m²     Gen: Alert and oriented, no acute distress  Respiratory: Normal respiratory effort  Abdomen: non-tender, non distended.   Pelvic:  Cough stress test: Negative in the supine position.  External genitalia: Normal for age.   Vagina: + Atrophy. No lesions.  No granulation tissue, no mesh exposure.   Support: Excellent, no residual prolapse noted.   Tenderness: None  Pelvic floor muscle strength: 2/5. Instructed on kegels.     Impression:  Encounter Diagnosis   Name Primary?    Urge incontinence Yes         Plan:  Expressed sympathy with 's passing.  She agrees to continue Trospium 20 mg PO in the evenings.  Instructed on kegel exercises and urge suppression.  Follow up in 3 months or sooner prn.       Velma  MD Tito, FACOG, FACS  Female Pelvic Medicine and  Reconstructive Surgery (Urogynecology)         No

## (undated) DIAGNOSIS — M85.80 OSTEOPENIA, UNSPECIFIED LOCATION: Primary | ICD-10-CM

## (undated) DEVICE — NEEDLE SPINAL BD 20GX3.5

## (undated) DEVICE — SOL H2O IRRIGATION 3000ML

## (undated) DEVICE — SUT VICRYL 2-0 CT-1 J945H

## (undated) DEVICE — STANDARD HYPODERMIC NEEDLE,POLYPROPYLENE HUB: Brand: MONOJECT

## (undated) DEVICE — Device

## (undated) DEVICE — SYRINGE,TOOMEY,IRRIGATION,70CC,STERILE: Brand: MEDLINE

## (undated) DEVICE — SOL  0.9% 1000ML

## (undated) DEVICE — SUT VICRYL 0 UR-6 J603H

## (undated) DEVICE — SUT VICRYL 0 CT-1 JJ31G

## (undated) DEVICE — #10 STERILE BLADE: Brand: POLYMER COATED BLADES

## (undated) DEVICE — SOL NACL IRRIG 0.9% 1000ML BTL

## (undated) DEVICE — MEDI-VAC NON-CONDUCTIVE SUCTION TUBING: Brand: CARDINAL HEALTH

## (undated) DEVICE — RETRACTOR LONE STAR STAYS LG

## (undated) DEVICE — CATH BARDEX IC FOLEY 16FR 5CC

## (undated) DEVICE — #15 STERILE STAINLESS BLADE: Brand: STERILE STAINLESS BLADES

## (undated) DEVICE — PENCIL TELESCOPE MEGADYNE SE

## (undated) DEVICE — STERILE POLYISOPRENE POWDER-FREE SURGICAL GLOVES: Brand: PROTEXIS

## (undated) DEVICE — DECANTER BAG 9": Brand: MEDLINE INDUSTRIES, INC.

## (undated) DEVICE — SUT VICRYL 0 CT-1 J260H

## (undated) DEVICE — VIOLET BRAIDED (POLYGLACTIN 910), SYNTHETIC ABSORBABLE SUTURE: Brand: COATED VICRYL

## (undated) DEVICE — DERMABOND CLOSURE 0.7ML TOPICL

## (undated) DEVICE — SUT VICRYL 0 CT-1 J740D

## (undated) DEVICE — SLEEVE KENDALL SCD EXPRESS MED

## (undated) DEVICE — TRAY SURESTEP 16 BARDEX UMETR

## (undated) DEVICE — COVER,MAYO STAND,STERILE: Brand: MEDLINE

## (undated) DEVICE — MEGADYNE ELECTRODE ADULT PT RT

## (undated) DEVICE — BAG DRAIN INFECTION CNTRL 2000

## (undated) DEVICE — SUT VICRYL 1CT-1  J341H

## (undated) DEVICE — SUT VICRYL 2-0 CT-1 J345H

## (undated) DEVICE — PREMIUM WET SKIN PREP TRAY: Brand: MEDLINE INDUSTRIES, INC.

## (undated) DEVICE — GYN CDS: Brand: MEDLINE INDUSTRIES, INC.

## (undated) DEVICE — TUBING CYSTO

## (undated) DEVICE — USE ITEM #176901

## (undated) DEVICE — SUT VICRYL 0 J608H

## (undated) DEVICE — ELECTRODE EDGE PENCIL 10FT

## (undated) NOTE — LETTER
LEBRON Notifier: Jeramy/Quofore   EDEN. Patient Name: Cindy Santana Identification Number: [id number]      Advance Beneficiary Notice of Noncoverage (ABN)  NOTE:  If Medicare doesn’t pay for D. Item/service(s) below, you may have to pay.   Medicare do the D. Item/service(s) listed above, but do not bill Medicare. You may ask to be paid now as I am responsible for payment. I cannot appeal if Medicare is not billed. ? OPTION 3. I don’t want the D. Item/service(s) listed above.  I understand with this linares

## (undated) NOTE — LETTER
KETAN. Notifier: [employer]. Patient Name: Mahnaz Wells Identification Number: [id number]                          Advance Beneficiary Notice of Noncoverage (ABN)   NOTE:  If Medicare doesn’t pay for D. item/service(s) below, you may have to pay.  Medicare does not pay for everything, even some care that you or your health care provider have good reason to think you need. We expect Medicare may not pay for the D. item/service(s) below.  D. Items or Services E. Reason Medicare May Not Pay: F. Estimated Cost   __ EKG ($87.00)  _x_ Pap smear ($101) _x_Pelvic/Breast ($147.00)  __ Ear Irrigation ($138)  __ Injection(s)  ___ Tdap ($181)       ___ Meningitis ($290)   __Prevnar ($555)  ___ Td ($66)              ___ Prevnar 20 ($549)  ___ Hep A ($152)     ___ Prolia ($1827)         __ Xiaflex ($            )   ___ Hep B ($150)     ___ Pneumovax ($287)                                         ___ Vaccine Administration ($65)   _x_ Medicare does not cover this service      _x_ Medicare may not pay for this   item/service for your condition     _x_ Medicare may not pay for this item/service as often as this     $248.00   WHAT YOU NEED TO DO NOW:  Read this notice, so you can make an informed decision about your care.  Ask us any questions that you may have after you finish reading.  Choose an option below about whether to receive the D. item/service(s) listed above.  Note: If you choose Option 1 or 2, we may help you to use any other insurance that you might have, but Medicare cannot require us to do this.  G. OPTIONS: Check only one box.  We cannot choose a box for you.   OPTION 1. I want the D. item/service(s) listed above. You may ask to be paid now, but I also want Medicare billed for an official decision on payment, which is sent to me on a Medicare Summary Notice (MSN). I understand that if Medicare doesn’t pay, I am responsible for payment, but I can appeal to Medicare by following the directions on the  MSN. If Medicare does pay, you will refund any payments I made to you, less co-pays or deductibles.  OPTION 2. I want the D. item/service(s) listed above, but do not bill Medicare. You may ask to be paid now as I am responsible for payment. I cannot appeal if Medicare is not billed.  OPTION 3. I don't want the D. item/service(s) listed above. I understand with this choice I am not responsible for payment, and I cannot appeal to see if Medicare would pay.    H. Additional Information:    This notice gives our opinion, not an official Medicare decision. If you have other questions on this notice or Medicare billing, call 1-800-MEDICARE ([phone]/TTY: [phone]). Signing below means that you have received and understand this notice. You also receive a copy.  I. Signature: J. Date:       You have the right to get Medicare information in an accessible format, like large print, Braille, or audio. You also have the right to file a complaint if you feel you’ve been discriminated against. Visit Medicare.gov/about- us/karokszpnvrxb-thpbirivqkkyyeqhp-ckedxv.  According to the Paperwork Reduction Act of 1995, no persons are required to respond to a collection of information unless it displays a valid OMB control number. The valid OMB control number for this information collection is 6554-0343. The time required to complete this information collection is estimated to average 7 minutes per response, including the time to review instructions, search existing data resources, gather the data needed, and complete and review the information collection. If you have comments concerning the accuracy of the time estimate or suggestions for improving this form, please write to: CMS, [facility] [employer]     Alex, Attn: SAVANNA Reports Clearance Officer, [place], [place] [address].  Form CMS-R-131 (Exp. 1/31/2026) Form Approved OMB No. [id number]

## (undated) NOTE — MR AVS SNAPSHOT
After Visit Summary   1/5/2022    Katerine Baker   MRN: [id number]           Visit Information     Date & Time  1/5/2022  9:30 AM Provider  Jesus Alexander MD [address] [facility]  Dept.  Phone  [address] [facility] CUSTOM]     Future Labs/Procedures Expected by Expires    [facility] EMI 2D+3D SCREENING BILAT (CPT=77067/38463) [COMBO CPT(R)]  1/5/2022 (Approximate) 1/5/2023    THINPREP PAP SMEAR B [[id number] CUSTOM]  1/5/2022 1/5/2023    US TRANSVAG/ABDOMINAL EMG ONLY [[id number] get signed up today! If you receive a survey from [employer], please take a few minutes to complete it and provide feedback. We strive to deliver the best patient experience and are looking for ways to make improvements.  Your feedback will help us